# Patient Record
Sex: MALE | Race: WHITE | Employment: FULL TIME | URBAN - METROPOLITAN AREA
[De-identification: names, ages, dates, MRNs, and addresses within clinical notes are randomized per-mention and may not be internally consistent; named-entity substitution may affect disease eponyms.]

---

## 2020-04-22 ENCOUNTER — VIRTUAL VISIT (OUTPATIENT)
Dept: ORTHOPEDIC SURGERY | Age: 50
End: 2020-04-22

## 2020-04-22 DIAGNOSIS — M54.17 LEFT LUMBOSACRAL RADICULOPATHY: Primary | ICD-10-CM

## 2020-04-22 DIAGNOSIS — S32.009K PSEUDOARTHROSIS OF LUMBAR SPINE: ICD-10-CM

## 2020-04-22 RX ORDER — METHYLPREDNISOLONE 4 MG/1
TABLET ORAL
Qty: 1 DOSE PACK | Refills: 0 | Status: ON HOLD | OUTPATIENT
Start: 2020-04-22 | End: 2020-07-28 | Stop reason: ALTCHOICE

## 2020-04-22 RX ORDER — NAPROXEN 500 MG/1
500 TABLET ORAL
COMMUNITY
Start: 2020-04-06 | End: 2020-07-13 | Stop reason: SDUPTHER

## 2020-04-22 RX ORDER — SIMVASTATIN 40 MG/1
40 TABLET, FILM COATED ORAL
COMMUNITY
Start: 2020-01-28

## 2020-04-22 RX ORDER — GABAPENTIN 100 MG/1
CAPSULE ORAL
Qty: 60 CAP | Refills: 1 | Status: SHIPPED | OUTPATIENT
Start: 2020-04-22 | End: 2020-05-12 | Stop reason: SDUPTHER

## 2020-04-22 NOTE — PROGRESS NOTES
Carol Casanova is a 54 y.o. male who was seen by synchronous (real-time) audio-video technology on 4/22/2020 through a [employer] platform. Consent:  He and/or his healthcare decision maker is aware that this patient-initiated Telehealth encounter is a billable service, with coverage as determined by his insurance carrier. He is aware that he may receive a bill and has provided verbal consent to proceed: Yes    I was in the office while conducting this encounter. Patient was at home. Visit start time 8:37 AM, end time 8:57 AM.        Assessment & Plan:   Diagnoses and all orders for this visit:    1. Left lumbosacral radiculopathy  -     methylPREDNISolone (MEDROL DOSEPACK) 4 mg tablet; Take as directed  -     gabapentin (NEURONTIN) 100 mg capsule; One tab po qhs x 3 days, then increase to one tab po bid thereafter  Indications: neuropathic pain    2. 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 54 y.o. male self-employed  3 weeks history of acute onset radiculopathy. I he does not appear to have progressive neurologic deficit. I am concerned about his gait and weakness. He is going to call in to the office in 2 days time to give me an update. He may need an MRI and decompressive surgery if he has progressive weakness. 2. Rx for MDP. No Naprosyn concurrently. 3. Trial of Gabapentin  4. If symptoms progress, I will see him in the office next week. Discussed emergent symptoms, may call at any time. 5. Avoid repetitive bending, lifting, and twisting   6. Advised pt all non-essential surgeries, spinal injections and MRI have been postponed due to COVID 19.   7. COVID 19 precautions: handwashing, staying at home, physical distancing. Do get some fresh air and sunshine. Follow-up and Dispositions    · Return for call office Friday with report of symptoms.         Cc: [facility]    Subjective:       Carol Casanova is seen today as a New Pt referred by  Mahesh Douglas for Back Pain (virtual Visit) and Leg Pain      Pain Assessment  4/22/2020   Location of Pain Back;Buttocks   Location Modifiers Left   Severity of Pain 4   Quality of Pain Aching   Quality of Pain Comment stabbing   Frequency of Pain Constant   Aggravating Factors (No Data)   Aggravating Factors Comment sneezing, positional    Limiting Behavior Some   Relieving Factors Heat   Relieving Factors Comment meds        Pt presents today with c/o back pain radiating into L buttock, posterior thigh and calf. He states this started 3 weeks ago upon waking up. He c/o worse pain with coughing, sneezing. He notes some relief with sitting. He admits he drags his LLE when in pain. Admits he trips when using ladders, but denies foot drop. He admits his pain wakes him at night. Denies RLE pain. Notes hx of episodic back pain. Pt reports taking Napryson 500mg PRN with some benefit, denies side effects. However he notes one instance of nausea with unclear cause. Pt affirms trying inversion and heat with benefit. He indicates his pain worsens through the day. He denies saddle paresthesias. Notes recent fecal urgency, denies other neurogenic bowel or bladder changes. Denies fever or cough. Affirms previous benefit with MDP for other issues. Pt denies any recent GI ulcers, bleeds or renal dysfucntion. Notes 1 bleeding ulcer in high school, but has had none since, even with using NSAIDS. Reviewed x-ray report from chiropractor down the. As I do not have the actual images I am uncertain as to if he has a mehdi articulation or a pseudoarthrosis, this may just be a matter semantics. Treatments patient has tried:  Physical therapy:No  Chiropractor: Yes temporary benefit  Doing HEP: Unknown  Non-opioid medications: Yes  Spinal injections: No  Spinal surgery- No.   Last L XR 2020: L L5-S1 pseudoarthrosis   Results reviewed with pt. Full report scanned in chart.  reviewed.  PMHx of chronic R knee pain, hyperlipidemia, remote bleeding ulcer (high school). Pt works as self employed , days and nights. He notes he is busier now than before. Controlled Substance Monitoring:    No flowsheet data found. Prior to Admission medications    Medication Sig Start Date End Date Taking? Authorizing Provider   naproxen (NAPROSYN) 500 mg tablet Take 500 mg by mouth two (2) times daily as needed. 4/6/20  Yes Provider, Historical   simvastatin (ZOCOR) 40 mg tablet Take 40 mg by mouth nightly. 1/28/20  Yes Provider, Historical   methylPREDNISolone (MEDROL DOSEPACK) 4 mg tablet Take as directed 4/22/20  Yes Sulma Canada MD   gabapentin (NEURONTIN) 100 mg capsule One tab po qhs x 3 days, then increase to one tab po bid thereafter  Indications: neuropathic pain 4/22/20  Yes Sulma Canada MD     Allergies no known allergies    Past Medical History:   Diagnosis Date    Chest pain, unspecified     Other and unspecified hyperlipidemia      Past Surgical History:   Procedure Laterality Date    IR CHOLECYSTOSTOMY PERCUTANEOUS  2006        Review of Systems   Constitutional: Negative for fever. Respiratory: Negative for cough. Musculoskeletal: Positive for back pain. Neurological: Negative for tingling and focal weakness. Psychiatric/Behavioral: The patient has insomnia. GENERAL :  Well developed, no acute distress  HENT  :  Atraumatic, normocephalic   SKIN:   No rash on visible areas. No abrasions. RESPIRATORY:  Non-labored breathing. No accessory respiratory muscle use. NEURO:  No tremor noted. Facial muscles symmetric. PSYCHIATRIC:  Normal affect. Conversant with normal thought process  MUSCULOSKELETAL: Ambulation with limp LLE and mild hip hiking. Due to this being a TeleHealth evaluation, many elements of the physical examination are unable to be assessed. We discussed the expected course, resolution and complications of the diagnosis(es) in detail.   Medication risks, benefits, interactions, and alternatives were discussed as indicated. I advised him to contact the office if his condition worsens, changes or fails to improve as anticipated. He expressed understanding with the diagnosis(es) and plan. Pursuant to the emergency declaration under the [facility], [facility] waiver authority and the [employer] Act, this Virtual  Visit was conducted, with patient's consent, to reduce the patient's risk of exposure to COVID-19 and provide continuity of care for an established patient. Services were provided through a video synchronous discussion virtually to substitute for in-person clinic visit. Dragon voice recognition software was used in the creation of this note. Unintended transcription, spelling, and grammar errors may be present. This document has been electronically signed but not proofread for these specific errors. Written by Sonam Morse, as dictated by Kenzie Ramesh MD.    I, Dr. Kenzie Raemsh MD, confirm that all documentation is accurate.

## 2020-04-24 ENCOUNTER — TELEPHONE (OUTPATIENT)
Dept: ORTHOPEDIC SURGERY | Age: 50
End: 2020-04-24

## 2020-04-24 DIAGNOSIS — M54.17 LEFT LUMBOSACRAL RADICULOPATHY: Primary | ICD-10-CM

## 2020-04-24 NOTE — TELEPHONE ENCOUNTER
Left voice message for patient to return call. Should patient call back please update that Stat MRI was placed today. No further actions needed at this time.

## 2020-04-24 NOTE — TELEPHONE ENCOUNTER
Patient called for Jason Castanon. Patient had a VV appt with Jason Castanon on 4/22/20. Patient said Dr. Carla Ely prescribed him a Medrol Dose pack. Patient said Jason Castanon wanted an update on how it is working for him. Patient said he feels the same . That he does not notice any difference on his Back. That the Medrol Dose Pack is not helping. Patient tel. [phone].

## 2020-04-27 DIAGNOSIS — S05.40XA RETROBULBAR FOREIGN BODY, UNSPECIFIED LATERALITY: Primary | ICD-10-CM

## 2020-04-27 NOTE — TELEPHONE ENCOUNTER
Called pt and he wants to keep his scheduled apt on 5-1 at [facility]. Sent notes to [employer] to get auth.

## 2020-04-27 NOTE — PROGRESS NOTES
Dwight Jones requested xray of the patient Orbits be placed prior to MRI on 5/1/2020 due to when scheduling the MRI patient stated he had gotten metals in his eyes.

## 2020-05-01 ENCOUNTER — HOSPITAL ENCOUNTER (OUTPATIENT)
Age: 50
Discharge: HOME | End: 2020-05-01
Attending: NURSE PRACTITIONER
Payer: COMMERCIAL

## 2020-05-01 DIAGNOSIS — M54.17 LEFT LUMBOSACRAL RADICULOPATHY: ICD-10-CM

## 2020-05-01 PROCEDURE — 72148 MRI LUMBAR SPINE W/O DYE: CPT

## 2020-05-01 NOTE — PROGRESS NOTES
Called and spoke with pt regarding L MRI results showing mild disc herniation. He reports that his leg pain has improved. He is on Gabapentin and Naproxen, he denies any weakness. He would like to wait on any surgery but would consider an injection if and when available.      Add this pt onto Dr. Xochitl Ruiz schedule in about 3 wks for follow up via VV

## 2020-05-07 NOTE — TELEPHONE ENCOUNTER
----- Message from Erickson Woodruff MD sent at 5/4/2020 11:40 AM EDT -----  Patient can be scheduled for an in office (1st available) visit for consideration of injections. Cx VV. We have never seen him in person. We should be opening up block in the next 2 weeks. Ok to schedule for Wed or Thursday this week, I should be back in office by then.

## 2020-05-07 NOTE — TELEPHONE ENCOUNTER
Spoke with pt, he requested Tuesday May 12th, due to not being available today. Pt will be seen in the office 5/12/20 with Dr. El Lara. No further actions needed at this time.

## 2020-05-12 ENCOUNTER — OFFICE VISIT (OUTPATIENT)
Dept: ORTHOPEDIC SURGERY | Age: 50
End: 2020-05-12

## 2020-05-12 VITALS
TEMPERATURE: 98.2 F | HEART RATE: 100 BPM | WEIGHT: 229 LBS | RESPIRATION RATE: 19 BRPM | DIASTOLIC BLOOD PRESSURE: 76 MMHG | SYSTOLIC BLOOD PRESSURE: 128 MMHG

## 2020-05-12 DIAGNOSIS — M54.17 LEFT LUMBOSACRAL RADICULOPATHY: ICD-10-CM

## 2020-05-12 DIAGNOSIS — M51.26 HNP (HERNIATED NUCLEUS PULPOSUS), LUMBAR: Primary | ICD-10-CM

## 2020-05-12 RX ORDER — GABAPENTIN 100 MG/1
100 CAPSULE ORAL 3 TIMES DAILY
Qty: 180 CAP | Refills: 0 | Status: SHIPPED | OUTPATIENT
Start: 2020-05-12 | End: 2020-05-13 | Stop reason: SDUPTHER

## 2020-05-12 NOTE — PROGRESS NOTES
[address], [address]  [place]  Phone: [phone]  Fax: [phone]        Shannan Stewart  : 1970  PCP: Isamar Butler MD    PROGRESS NOTE      ASSESSMENT AND PLAN    Diagnoses and all orders for this visit:    1. HNP (herniated nucleus pulposus), lumbar, L L4/5, onset early 2020    2. Left lumbosacral radiculopathy  -     gabapentin (NEURONTIN) 100 mg capsule; Take 1 Cap by mouth three (3) times daily. Max Daily Amount: 300 mg. Indications: neuropathic pain      3 72-year-old self-employed  with 6 weeks of lumbar radiculopathy secondary to a left lateral HNP at lumbar 4/5. Patient is clinically stable. 2. No indications for lumbar surgery at this time. 3. Increase Gabapentin to 100mg TID. May take 2 tab QHS if needed. 4. Discussed life style modification, PT, medication, spinal injection, and surgery as treatment options   5. Would hold off doing spinal injections for now.,  Will reevaluate at next visit. 6. Avoid repetitive bending, lifting, and twisting. Avoid lifting more than 20lbs if able. 7. Given information on HNP, MAGO     Follow-up and Dispositions    · Return in about 1 month (around 2020) for in office per Dr. Moo Handy. HISTORY OF PRESENT ILLNESS  Steven Rivera is a 54 y.o. male. Pt presents to the office for a f/u visit for L lumbosacral radiculopathy. He was last evaluated 20 virtually and given MDP and trial of Gabapentin at that time. Since last OV pt called with persistent symptoms and a lumbar spine MRI was ordered. Images reviewed with pt. He notes benefit with MDP. He reports good benefit with Gabapentin overall though it does not erase all of his pain. Pt notes one day he skipped Gabapentin because he was rushing, but had a noticeable increase in pain. Denies negative side effects.  Pt notes his wife is painting the bedroom and the mattress was on the floor without the box spring the night his pain developed 6 weeks ago. He states while it was more aggravated he has a lot of pain with laying flat, so enjoyed the recliner. At that time he also had shooting pain with sneezing. Now he states he has mild irritation in LLE with sneezing. He states at this time his pain is worse after work when he has been moving and bending and such. LLE > low back. He admits to a history of minor low back pain. Location of pain: low back  Does pain radiate into extremities: L buttock, posterior thigh to midcalf  Does patient have weakness: no   Pt denies saddle paresthesias. Medications pt is on: Gabapentin 100mg BID with benefit. Naprosyn PRN (1 every few days), admits to nausea w/o food. Sulfur supplement. Denies persistent fevers, chills, weight changes, neurogenic bowel or bladder symptoms. Pt denies recent ED visits or hospitalizations. Treatments patient has tried:  Physical therapy:No  Chiropractor: Yes temporary benefit 4/2020  Doing HEP: Unknown  Non-opioid medications: Yes  Spinal injections: No  Spinal surgery- No.   Last L MRI 5/2020: HNP L lateral recess L4-5  Last L XR 2020: L L5-S1 pseudoarthrosis      reviewed. PMHx of chronic R knee pain, hyperlipidemia, remote bleeding ulcer (high school). Pt works as self employed , days and nights. He notes he is busier now than before. Pain Assessment  5/12/2020   Location of Pain Back; Hip   Location Modifiers Left   Severity of Pain 3   Quality of Pain Aching   Quality of Pain Comment -   Frequency of Pain Constant   Aggravating Factors Standing;Walking;Bending   Aggravating Factors Comment pressure   Limiting Behavior Some   Relieving Factors (No Data)   Relieving Factors Comment gabapentin   Result of Injury No     MRI Results (most recent):  Results from Hospital Encounter encounter on 05/01/20   MRI LUMB SPINE WO CONT    Narrative EXAM: MRI LUMB SPINE WO CONT    CLINICAL INDICATIONS/HISTORY: LLE pain and weakness over the past one month  without preceding injury    COMPARISON: Body CT 2006    Technique: Multi-sequence multiplanar T1, T2, STIR imaging with and without fat  saturation obtained centered on the lumbar spine. FINDINGS:     Alignment: Intact lordosis  Vertebral body height: Normal  Marrow signal: Unremarkable  Disc spaces: Mild narrowing and desiccation of L2-3  Conus: Terminates at T12-L1    Axial imaging correlation:    T12-L1: Patent canal and foramina. L1-2: Patent canal and foramina. L2-3: Patent canal and foramina. L3-4: Patent canal and foramina. L4-5: There is a left paracentral disc extrusion extending caudad. This impinges  the crossing left L5 nerve as on axial T2 series 6 images 12-13. Minor facet  arthropathy. No central spinal stenosis. The foramina are patent. L5-S1: Patent canal and foramina. Other structures: Unremarkable. Impression IMPRESSION:    1. Small focal left paracentral disc extrusion at L4-5 impinges the left L5  nerve; as outlined above  -Patent canal and foramina                PAST MEDICAL HISTORY   Past Medical History:   Diagnosis Date    Chest pain, unspecified     Other and unspecified hyperlipidemia        Past Surgical History:   Procedure Laterality Date    IR CHOLECYSTOSTOMY PERCUTANEOUS  2006   . MEDICATIONS      Current Outpatient Medications   Medication Sig Dispense Refill    gabapentin (NEURONTIN) 100 mg capsule Take 1 Cap by mouth three (3) times daily. Max Daily Amount: 300 mg. Indications: neuropathic pain 180 Cap 0    simvastatin (ZOCOR) 40 mg tablet Take 40 mg by mouth nightly.  naproxen (NAPROSYN) 500 mg tablet Take 500 mg by mouth two (2) times daily as needed.  methylPREDNISolone (MEDROL DOSEPACK) 4 mg tablet Take as directed 1 Dose Pack 0        Controlled Substance Monitoring:    No flowsheet data found.      ALLERGIES  No Known Allergies       SOCIAL HISTORY    Social History     Socioeconomic History    Marital status:      Spouse name: Not on file    Number of children: Not on file    Years of education: Not on file    Highest education level: Not on file   Occupational History    Not on file   Social Needs    Financial resource strain: Not on file    Food insecurity     Worry: Not on file     Inability: Not on file    Transportation needs     Medical: Not on file     Non-medical: Not on file   Tobacco Use    Smoking status: Unknown If Ever Smoked   Substance and Sexual Activity    Alcohol use: Not on file     Comment: no significant hx     Drug use: Not on file    Sexual activity: Not on file   Lifestyle    Physical activity     Days per week: Not on file     Minutes per session: Not on file    Stress: Not on file   Relationships    Social connections     Talks on phone: Not on file     Gets together: Not on file     Attends Latter day service: Not on file     Active member of club or organization: Not on file     Attends meetings of clubs or organizations: Not on file     Relationship status: Not on file    Intimate partner violence     Fear of current or ex partner: Not on file     Emotionally abused: Not on file     Physically abused: Not on file     Forced sexual activity: Not on file   Other Topics Concern    Not on file   Social History Narrative    Not on file       FAMILY HISTORY    Family History   Problem Relation Age of Onset    Heart Disease Other         general family hx of ischemic heart disease    Cancer Mother         pancreatic    Hypertension Father     Heart Disease Father        REVIEW OF SYSTEMS  Review of Systems   Constitutional: Negative for chills, fever and weight loss. Respiratory: Negative for shortness of breath. Cardiovascular: Negative for chest pain. Gastrointestinal: Negative for constipation. Negative for fecal incontinence   Genitourinary: Negative for dysuria.         Negative for urinary incontinence   Musculoskeletal:        Per HPI   Skin: Negative for rash. Neurological: Positive for focal weakness. Negative for dizziness, tingling, tremors and headaches. Endo/Heme/Allergies: Does not bruise/bleed easily. Psychiatric/Behavioral: The patient does not have insomnia. PHYSICAL EXAMINATION  Visit Vitals  /76 (BP 1 Location: Left arm, BP Patient Position: Sitting)   Pulse 100   Temp 98.2 °F (36.8 °C) (Oral)   Resp 19   Wt 229 lb (103.9 kg)         Accompanied by self. Constitutional:  Well developed, well nourished, in no acute distress. Psychiatric: Affect and mood are appropriate. Integumentary: No rashes or abrasions noted on exposed areas. Cardiovascular/Peripheral Vascular: No peripheral edema is noted BLE. SPINE/MUSCULOSKELETAL EXAM      Lumbar spine:  No rash, ecchymosis, or gross obliquity. No fasciculations. No focal atrophy is noted. Pain with lumbar forward flexion. Intact extension. Tenderness to palpation none lumbar spine. No tenderness to palpation at the sciatic notch. SI joints non-tender. Trochanters non tender. MOTOR:     Hip Flex Quads Hamstrings Ankle DF EHL Ankle PF   Right 5/5 5/5 5/5 5/5 5/5 5/5   Left 5/5 5/5 5/5 5/5 5/5 5/5     Mild L eversion weakness +4/5    Straight Leg raise mildly positive on L at 90 degrees. No difficulty with tandem gait. Intact Heel walk. Ambulation without assistive device. FWB. Written by Devona Rinne, as dictated by Heather Sanchez MD.    I, Dr. Heather Sanchez MD, confirm that all documentation is accurate. Mr. Killian Taylor may have a reminder for a \"due or due soon\" health maintenance. I have asked that he contact his primary care provider for follow-up on this health maintenance.

## 2020-05-12 NOTE — PROGRESS NOTES
Alexia Rodrigues presents today for No chief complaint on file. Is someone accompanying this pt? NO    Is the patient using any DME equipment during OV? NO    Depression Screening:  3 most recent PHQ Screens 5/12/2020   Little interest or pleasure in doing things Not at all   Feeling down, depressed, irritable, or hopeless Not at all   Total Score PHQ 2 0         Coordination of Care:  1. Have you been to the ER, urgent care clinic since your last visit? NO  Hospitalized since your last visit? NO    2. Have you seen or consulted any other health care providers outside of the [address] [place] [address] since your last visit? NO Include any pap smears or colon screening.  NO    Last  Checked 5/12/2020

## 2020-05-12 NOTE — PATIENT INSTRUCTIONS
Herniated Disc: Care Instructions Your Care Instructions The bones that form the spine in your back are cushioned by small discs. If a disc is damaged, it may bulge or break open (herniate). A herniated disc can result from normal wear and tear as we age or from an injury or disease. If a herniated disc presses on a nerve, it can cause pain and numbness in your leg (sciatica) and/or back pain. You may be able to heal your herniated disc with a few weeks or months of rest, medicine, and exercises. In some cases, you may need surgery. Follow-up care is a key part of your treatment and safety. Be sure to make and go to all appointments, and call your doctor if you are having problems. It's also a good idea to know your test results and keep a list of the medicines you take. How can you care for yourself at home? · Take your medicines exactly as prescribed. Call your doctor if you think you are having a problem with your medicine. · Ask your doctor if you can take an over-the-counter pain medicine, such as acetaminophen (Tylenol), ibuprofen (Advil, Motrin), or naproxen (Aleve). Read and follow all instructions on the label. · Do not take two or more pain medicines at the same time unless the doctor told you to. Many pain medicines have acetaminophen, which is Tylenol. Too much acetaminophen (Tylenol) can be harmful. · Rest your back if your pain is severe. · Avoid movements and positions that increase your pain or numbness. · Try taking short walks and doing light activities that do not cause pain. Even if you are feeling some pain, it is important to keep your muscles active and strong. · Use heat or ice to relieve pain. ? To apply heat, put a warm water bottle, heating pad set on low, or warm cloth on your back. Do not go to sleep with a heating pad on your skin. ? To use ice, put ice or a cold pack on the area for 10 to 20 minutes at a time. Put a thin cloth between the ice and your skin. · Your doctor may recommend a physical therapy program, where you learn exercises to do at home. These exercises strengthen the muscles that support your lower back and prevent reinjury. · Stay at a healthy weight. This may reduce the load on your back. · Quit smoking if you smoke. If you need help quitting, talk to your doctor about stop-smoking programs and medicines. These can increase your chances of quitting for good. · To avoid hurting your back when lifting: ? Lift with your legs, not your back, by squatting and bending your knees. Avoid bending forward at the waist when lifting. ? Rise slowly. ? Keep the load as close to your body as possible, at the level of your navel. ? Avoid turning or twisting your body while holding a heavy object. ? Get help if you need to lift a heavy object. Never lift a heavy object above shoulder level. When should you call for help? Call 911 anytime you think you may need emergency care. For example, call if: 
  · You are unable to move a leg at all.  
[facility] your doctor now or seek immediate medical care if: 
  · You have new or worse symptoms in your arms, legs, chest, belly, or buttocks. Symptoms may include: 
? Numbness or tingling. ? Weakness. ? Pain.  
  · You lose bladder or bowel control.  
 Watch closely for changes in your health, and be sure to contact your doctor if: 
  · You are not getting better as expected. Where can you learn more? Go to http://regina-les.info/ Enter F534 in the search box to learn more about \"Herniated Disc: Care Instructions. \" Current as of: June 26, 2019Content Version: 12.4 © 2804-2011 Healthwise[employer] Incorporated. Care instructions adapted under license by [employer] (which disclaims liability or warranty for this information).  If you have questions about a medical condition or this instruction, always ask your healthcare professional. Mason Patiño disclaims any warranty or liability for your use of this information. Learning About Lumbar Epidural Steroid Injections What is a lumbar epidural steroid injection? A doctor may give you a lumbar epidural steroid injection to try to decrease pain, tingling, or numbness in your back, buttock, or leg. These might be the result of a back or disc problem. The injection goes directly into your epidural space. This is the area in your back around your spinal cord. This injection may have both a local anesthetic and a steroid medicine. Or it may only have a steroid. Local anesthetic medicines numb your nerves right away for a short time. Steroids reduce swelling and pain. But they take a few days to start working. Some people get a series of these injections over weeks or months. How is a lumbar epidural done? The doctor may use an imaging test before or during your injection. This can be an MRI, a CT scan, or an X-ray. These tests can show where your nerve problems are. After finding the right spot, the doctor may inject a numbing medicine into the skin where you will get the steroid injection. Then he or she puts the needle for the steroid into the numbed area. You may feel some pressure. You could feel some stinging or burning during the injection. It takes about 10 to 15 minutes to get this injection. You will probably go home about 20 to 30 minutes after you get it. You will need someone to drive you home. What can you expect after a lumbar epidural? 
If your injection had local anesthetic and a steroid, your legs may feel heavy or numb right after. You will probably be able to walk. But you may need to be extra careful. Take care not to lose your balance and be sure to follow your doctor's instructions. If your injection contained local anesthetic, you may feel better right away. But this pain relief will last only a few hours. Your pain will probably return.  This is because the steroids have not started working yet. Before the steroids start to work, your back may be sore for a few days. These injections don't always work. When they do, it takes 1 to 5 days. This pain relief can last for several days to a few months or longer. You may want to do less than normal for a few days. But you may also be able to return to your daily routine. Some people are dizzy or feel sick to their stomach after getting this injection. These symptoms usually do not last very long. If your pain is better, you may be able to keep doing your normal activities or physical therapy. But try not to overdo it, even if your back pain has improved a lot. If your pain is only a little better or if it comes back, your doctor may recommend another injection in a few weeks. If your pain has not changed, talk to your doctor about other treatment choices. Side effects from an epidural steroid injection include headache, fever, or infection. Serious side effects are rare. But they can include stroke, paralysis, or loss of vision. Follow-up care is a key part of your treatment and safety. Be sure to make and go to all appointments, and call your doctor if you are having problems. It's also a good idea to know your test results and keep a list of the medicines you take. Where can you learn more? Go to http://regina-les.info/ Enter Reagannerissa Cowart in the search box to learn more about \"Learning About Lumbar Epidural Steroid Injections. \" Current as of: June 26, 2019Content Version: 12.4 © 4942-6968 Healthwise, Incorporated. Care instructions adapted under license by [employer] (which disclaims liability or warranty for this information). If you have questions about a medical condition or this instruction, always ask your healthcare professional. [address] any warranty or liability for your use of this information.

## 2020-05-13 DIAGNOSIS — M54.17 LEFT LUMBOSACRAL RADICULOPATHY: ICD-10-CM

## 2020-05-13 RX ORDER — GABAPENTIN 100 MG/1
100 CAPSULE ORAL 3 TIMES DAILY
Qty: 270 CAP | Refills: 0 | Status: SHIPPED | OUTPATIENT
Start: 2020-05-13 | End: 2020-07-13 | Stop reason: DRUGHIGH

## 2020-05-13 NOTE — TELEPHONE ENCOUNTER
Insurance requires a minimum fill for 90 days    Requested Prescriptions     Pending Prescriptions Disp Refills    gabapentin (NEURONTIN) 100 mg capsule 270 Cap 0     Sig: Take 1 Cap by mouth three (3) times daily. Max Daily Amount: 300 mg.  Indications: neuropathic pain

## 2020-07-13 ENCOUNTER — OFFICE VISIT (OUTPATIENT)
Dept: ORTHOPEDIC SURGERY | Age: 50
End: 2020-07-13

## 2020-07-13 VITALS
SYSTOLIC BLOOD PRESSURE: 118 MMHG | HEART RATE: 76 BPM | TEMPERATURE: 98.3 F | RESPIRATION RATE: 16 BRPM | WEIGHT: 244 LBS | HEIGHT: 68 IN | OXYGEN SATURATION: 95 % | DIASTOLIC BLOOD PRESSURE: 82 MMHG | BODY MASS INDEX: 36.98 KG/M2

## 2020-07-13 DIAGNOSIS — M54.17 LEFT LUMBOSACRAL RADICULOPATHY: ICD-10-CM

## 2020-07-13 DIAGNOSIS — M51.26 HNP (HERNIATED NUCLEUS PULPOSUS), LUMBAR: Primary | ICD-10-CM

## 2020-07-13 PROBLEM — E66.01 SEVERE OBESITY (HCC): Status: ACTIVE | Noted: 2020-07-13

## 2020-07-13 RX ORDER — GABAPENTIN 300 MG/1
300 CAPSULE ORAL 3 TIMES DAILY
Qty: 270 CAP | Refills: 0 | Status: SHIPPED | OUTPATIENT
Start: 2020-07-13 | End: 2020-10-11

## 2020-07-13 RX ORDER — NAPROXEN 500 MG/1
500 TABLET ORAL
Qty: 100 TAB | Refills: 0 | Status: SHIPPED | OUTPATIENT
Start: 2020-07-13 | End: 2021-07-02 | Stop reason: SDUPTHER

## 2020-07-13 RX ORDER — RIZATRIPTAN BENZOATE 10 MG/1
TABLET, ORALLY DISINTEGRATING ORAL
COMMUNITY
Start: 2019-12-18

## 2020-07-13 NOTE — PROGRESS NOTES
[address], [address]  [place], [address] [place] [address] Street  Phone: [phone]  Fax: [phone]        Gómez Chawla  : 1970  PCP: Giovanny Ospina MD    PROGRESS NOTE      ASSESSMENT AND PLAN    Diagnoses and all orders for this visit:    1. HNP (herniated nucleus pulposus), lumbar,  L L4/5, symptomatic 3/2020  -     naproxen (NAPROSYN) 500 mg tablet; Take 1 Tab by mouth two (2) times daily as needed for Pain.  -     SCHEDULE SURGERY  -     gabapentin (NEURONTIN) 300 mg capsule; Take 1 Cap by mouth three (3) times daily for 90 days. Max Daily Amount: 900 mg.    2. Left lumbosacral radiculopathy      1. 54 y.o. male quang-employed  w/ 4 months of symptomatic HNP. 2. Advised to continue HEP  3. Avoid repetitive bending, lifting, and twisting  4. Advised to talk to GI doctor about his GI issues  5. Continue Naprosyn  6. Increase Gabapentin 300 mg TID  7. Discussed life style modification, PT, medication, spinal injection, and surgery as treatment options. Wants to wait until winter for sx  8. SNRB L L5      Follow-up and Dispositions    · Return in 1 month (on 2020) for After injections. HISTORY OF PRESENT ILLNESS  Carlos Mcbride is a 54 y.o. male. Pt was last evaluated 2020 for HNP. Increase Gabapentin to 100 mg TID. Held off on spinal injections to reevaluate at next visit. Pt states that his pain has been getting worse after previously being better for a few weeks, noting that his pain is coming over to the right side now. He states that he can't stand because the pain starts immediately. Pt denies any specific incident or injury that caused their pain. Pain Assessment  2020   Location of Pain Back;Leg   Location Modifiers Right;Left   Severity of Pain 5   Quality of Pain Sharp; Other (Comment)   Quality of Pain Comment stabbing   Duration of Pain Persistent   Frequency of Pain Constant   Aggravating Factors Standing;Walking   Aggravating Factors Comment -   Limiting Behavior Some   Relieving Factors Rest   Relieving Factors Comment sitting   Result of Injury No       Does pain radiate into extremities: R hip  Does patient have weakness: No  Pt denies saddle paresthesias. Reports fecal incontinence and leakage x years. Medications pt is on: Gabapentin 100mg 1 qam, 2 qhs with benefit. Naprosyn 500 mg PRN (1 every few days), admits to nausea w/o food. Sulfur supplement. Denies persistent fevers, chills, weight changes, neurogenic bladder symptoms.       Treatments patient has tried:  Physical therapy:No  Chiropractor: Yes temporary benefit 4/2020  Doing HEP: Yes; stretches  Non-opioid medications: Yes  Spinal injections: No  Spinal surgery- No.   Last L MRI 5/2020: HNP L lateral recess L4-5  Last L XR 2020: L L5-S1 pseudoarthrosis       reviewed. PMHx of chronic R knee pain, hyperlipidemia, remote bleeding ulcer (high school), chronic diarrhea.  Pt works as self employed , days and nights. He notes he is busier now than before. Recently lost father, and his foster daughter was adopted. PAST MEDICAL HISTORY   Past Medical History:   Diagnosis Date    Chest pain, unspecified     Other and unspecified hyperlipidemia        Past Surgical History:   Procedure Laterality Date    IR CHOLECYSTOSTOMY PERCUTANEOUS  2006   . MEDICATIONS      Current Outpatient Medications   Medication Sig Dispense Refill    rizatriptan (MAXALT-MLT) 10 mg disintegrating tablet take 1 tablet by mouth AT START OF HEADACHE, REPEAT 1 TIME IN 2 HOURS IF PAIN PERSIST maximum daily dose of 2      naproxen (NAPROSYN) 500 mg tablet Take 1 Tab by mouth two (2) times daily as needed for Pain. 100 Tab 0    gabapentin (NEURONTIN) 300 mg capsule Take 1 Cap by mouth three (3) times daily for 90 days. Max Daily Amount: 900 mg. 270 Cap 0    simvastatin (ZOCOR) 40 mg tablet Take 40 mg by mouth nightly.       methylPREDNISolone (MEDROL DOSEPACK) 4 mg tablet Take as directed 1 Dose Pack 0        Controlled Substance Monitoring:    No flowsheet data found.      ALLERGIES  No Known Allergies       SOCIAL HISTORY    Social History     Socioeconomic History    Marital status:      Spouse name: Not on file    Number of children: Not on file    Years of education: Not on file    Highest education level: Not on file   Occupational History    Not on file   Social Needs    Financial resource strain: Not on file    Food insecurity     Worry: Not on file     Inability: Not on file    Transportation needs     Medical: Not on file     Non-medical: Not on file   Tobacco Use    Smoking status: Never Smoker    Smokeless tobacco: Never Used   Substance and Sexual Activity    Alcohol use: Never     Frequency: Never     Comment: no significant hx     Drug use: Never    Sexual activity: Not on file   Lifestyle    Physical activity     Days per week: Not on file     Minutes per session: Not on file    Stress: Not on file   Relationships    Social connections     Talks on phone: Not on file     Gets together: Not on file     Attends Synagogue service: Not on file     Active member of club or organization: Not on file     Attends meetings of clubs or organizations: Not on file     Relationship status: Not on file    Intimate partner violence     Fear of current or ex partner: Not on file     Emotionally abused: Not on file     Physically abused: Not on file     Forced sexual activity: Not on file   Other Topics Concern    Not on file   Social History Narrative    Not on file     Socioeconomic History    Marital status:      Spouse name: Not on file    Number of children: Not on file    Years of education: Not on file    Highest education level: Not on file   Occupational History    Not on file   Social Needs    Financial resource strain: Not on file    Food insecurity     Worry: Not on file     Inability: Not on file   [address] [place] [address] Transportation needs     Medical: Not on file     Non-medical: Not on file   Tobacco Use    Smoking status: Never Smoker    Smokeless tobacco: Never Used   Substance and Sexual Activity    Alcohol use: Never     Frequency: Never     Comment: no significant hx     Drug use: Never    Sexual activity: Not on file   Lifestyle    Physical activity     Days per week: Not on file     Minutes per session: Not on file    Stress: Not on file   Relationships    Social connections     Talks on phone: Not on file     Gets together: Not on file     Attends Sikh service: Not on file     Active member of club or organization: Not on file     Attends meetings of clubs or organizations: Not on file     Relationship status: Not on file    Intimate partner violence     Fear of current or ex partner: Not on file     Emotionally abused: Not on file     Physically abused: Not on file     Forced sexual activity: Not on file   Other Topics Concern    Not on file   Social History Narrative    Not on file      Problem Relation Age of Onset    Heart Disease Other         general family hx of ischemic heart disease    Cancer Mother         pancreatic    Hypertension Father     Heart Disease Father        REVIEW OF SYSTEMS  Review of Systems   Constitutional: Negative for chills, fever and weight loss. Respiratory: Negative for shortness of breath. Cardiovascular: Negative for chest pain. Gastrointestinal: Positive for diarrhea. Negative for constipation. Positive for fecal incontinence   Genitourinary: Negative for dysuria. Negative for urinary incontinence   Musculoskeletal: Positive for back pain and joint pain. Per HPI   Skin: Negative for rash. Neurological: Negative for dizziness, tingling, tremors, focal weakness and headaches. Endo/Heme/Allergies: Does not bruise/bleed easily. Psychiatric/Behavioral: The patient does not have insomnia.         PHYSICAL EXAMINATION  Visit Vitals  BP 118/82 (BP 1 Location: Right arm, BP Patient Position: Sitting)   Pulse 76   Temp 98.3 °F (36.8 °C) (Oral)   Resp 16   Ht 5' 8\" (1.727 m)   Wt 244 lb (110.7 kg)   SpO2 95%   BMI 37.10 kg/m²         Accompanied by self. Constitutional:  Well developed, well nourished, in no acute distress. Psychiatric: Affect and mood are appropriate. Integumentary: No rashes or abrasions noted on exposed areas. Cardiovascular/Peripheral Vascular: No peripheral edema is noted BLE. SPINE/MUSCULOSKELETAL EXAM    Lumbar spine:  No rash, ecchymosis, or gross obliquity. No fasciculations. No focal atrophy is noted. Tenderness to palpation L L4/5, L sciatic notch. SI joints non-tender. Trochanters non tender. MOTOR:     Hip Flex Quads Hamstrings Ankle DF EHL Ankle PF   Right 5/5 5/5 5/5 5/5 5/5 5/5   Left 5/5 5/5 5/5 5/5 5/5 5/5   L eversion +4/5    Straight Leg raise positive on the L at 60 degrees. No difficulty with tandem gait. Radiating buttock pain with heel walk. Ambulation without assistive device. FWB. Written by Stephen Bragg, as dictated by Aarti Hoskins MD.    I, Dr. Aarti Hoskins MD, confirm that all documentation is accurate. Mr. Michael Oakes may have a reminder for a \"due or due soon\" health maintenance. I have asked that he contact his primary care provider for follow-up on this health maintenance.

## 2020-07-13 NOTE — H&P (VIEW-ONLY)
[address] 
[address], [address] Phone: [phone] Fax: [phone] Afua Osei : 1970 PCP: Judd Middleton MD 
 
PROGRESS NOTE ASSESSMENT AND PLAN Diagnoses and all orders for this visit: 
 
1. HNP (herniated nucleus pulposus), lumbar,  L L4/5, symptomatic 3/2020 
-     naproxen (NAPROSYN) 500 mg tablet; Take 1 Tab by mouth two (2) times daily as needed for Pain. 
-     SCHEDULE SURGERY 
-     gabapentin (NEURONTIN) 300 mg capsule; Take 1 Cap by mouth three (3) times daily for 90 days. Max Daily Amount: 900 mg. 
 
2. Left lumbosacral radiculopathy 1. 54 y.o. male quang-employed  w/ 4 months of symptomatic HNP. 2. Advised to continue HEP 3. Avoid repetitive bending, lifting, and twisting 4. Advised to talk to GI doctor about his GI issues 5. Continue Naprosyn 6. Increase Gabapentin 300 mg TID 7. Discussed life style modification, PT, medication, spinal injection, and surgery as treatment options. Wants to wait until winter for sx 8. [facility] L L5 Follow-up and Dispositions · Return in 1 month (on 2020) for After injections. HISTORY OF PRESENT ILLNESS Sherri Escudero is a 54 y.o. male. Pt was last evaluated 2020 for HNP. Increase Gabapentin to 100 mg TID. Held off on spinal injections to reevaluate at next visit. Pt states that his pain has been getting worse after previously being better for a few weeks, noting that his pain is coming over to the right side now. He states that he can't stand because the pain starts immediately. Pt denies any specific incident or injury that caused their pain. Pain Assessment  2020 Location of Pain Back;Leg Location Modifiers Right;Left Severity of Pain 5 Quality of Pain Sharp; Other (Comment) Quality of Pain Comment stabbing Duration of Pain Persistent Frequency of Pain Constant Aggravating Factors Standing;Walking Aggravating Factors Comment - Limiting Behavior Some Relieving Factors Rest  
Relieving Factors Comment sitting Result of Injury No  
 
 
Does pain radiate into extremities: R hip Does patient have weakness: No 
Pt denies saddle paresthesias. Reports fecal incontinence and leakage x years. Medications pt is on: Gabapentin 100mg 1 qam, 2 qhs with benefit. Naprosyn 500 mg PRN (1 every few days), admits to nausea w/o food. Sulfur supplement. Denies persistent fevers, chills, weight changes, neurogenic bladder symptoms.   
  
Treatments patient has tried: 
Physical therapy:No 
Chiropractor: Yes temporary benefit 4/2020 Doing HEP: Yes; stretches Non-opioid medications: Yes Spinal injections: No 
Spinal surgery- No.  
Last L MRI 5/2020: HNP L lateral recess L4-5 Last L XR 2020: L L5-S1 pseudoarthrosis  
  
 reviewed. PMHx of chronic R knee pain, hyperlipidemia, remote bleeding ulcer (high school), chronic diarrhea.  Pt works as self employed , days and nights. He notes he is busier now than before. Recently lost father, and his foster daughter was adopted. PAST MEDICAL HISTORY Past Medical History:  
Diagnosis Date  Chest pain, unspecified  Other and unspecified hyperlipidemia Past Surgical History:  
Procedure Laterality Date  IR CHOLECYSTOSTOMY PERCUTANEOUS  2006 Lindsey Muck MEDICATIONS Current Outpatient Medications Medication Sig Dispense Refill  rizatriptan (MAXALT-MLT) 10 mg disintegrating tablet take 1 tablet by mouth AT START OF HEADACHE, REPEAT 1 TIME IN 2 HOURS IF PAIN PERSIST maximum daily dose of 2    
 naproxen (NAPROSYN) 500 mg tablet Take 1 Tab by mouth two (2) times daily as needed for Pain. 100 Tab 0  
 gabapentin (NEURONTIN) 300 mg capsule Take 1 Cap by mouth three (3) times daily for 90 days. Max Daily Amount: 900 mg. 270 Cap 0  
 simvastatin (ZOCOR) 40 mg tablet Take 40 mg by mouth nightly.  methylPREDNISolone (MEDROL DOSEPACK) 4 mg tablet Take as directed 1 Dose Pack 0 Controlled Substance Monitoring: No flowsheet data found. ALLERGIES No Known Allergies SOCIAL HISTORY Social History Socioeconomic History  Marital status:  Spouse name: Not on file  Number of children: Not on file  Years of education: Not on file  Highest education level: Not on file Occupational History  Not on file Social Needs  Financial resource strain: Not on file  Food insecurity Worry: Not on file Inability: Not on file  Transportation needs Medical: Not on file Non-medical: Not on file Tobacco Use  Smoking status: Never Smoker  Smokeless tobacco: Never Used Substance and Sexual Activity  Alcohol use: Never Frequency: Never Comment: no significant hx  Drug use: Never  Sexual activity: Not on file Lifestyle  Physical activity Days per week: Not on file Minutes per session: Not on file  Stress: Not on file Relationships  Social connections Talks on phone: Not on file Gets together: Not on file Attends Shinto service: Not on file Active member of club or organization: Not on file Attends meetings of clubs or organizations: Not on file Relationship status: Not on file  Intimate partner violence Fear of current or ex partner: Not on file Emotionally abused: Not on file Physically abused: Not on file Forced sexual activity: Not on file Other Topics Concern  Not on file Social History Narrative  Not on file Socioeconomic History  Marital status:  Spouse name: Not on file  Number of children: Not on file  Years of education: Not on file  Highest education level: Not on file Occupational History  Not on file Social Needs  Financial resource strain: Not on file  Food insecurity Worry: Not on file Inability: Not on file  Transportation needs Medical: Not on file Non-medical: Not on file Tobacco Use  Smoking status: Never Smoker  Smokeless tobacco: Never Used Substance and Sexual Activity  Alcohol use: Never Frequency: Never Comment: no significant hx  Drug use: Never  Sexual activity: Not on file Lifestyle  Physical activity Days per week: Not on file Minutes per session: Not on file  Stress: Not on file Relationships  Social connections Talks on phone: Not on file Gets together: Not on file Attends Baptism service: Not on file Active member of club or organization: Not on file Attends meetings of clubs or organizations: Not on file Relationship status: Not on file  Intimate partner violence Fear of current or ex partner: Not on file Emotionally abused: Not on file Physically abused: Not on file Forced sexual activity: Not on file Other Topics Concern  Not on file Social History Narrative  Not on file Problem Relation Age of Onset  Heart Disease Other   
     general family hx of ischemic heart disease  Cancer Mother   
     pancreatic  Hypertension Father  Heart Disease Father REVIEW OF SYSTEMS Review of Systems Constitutional: Negative for chills, fever and weight loss. Respiratory: Negative for shortness of breath. Cardiovascular: Negative for chest pain. Gastrointestinal: Positive for diarrhea. Negative for constipation. Positive for fecal incontinence Genitourinary: Negative for dysuria. Negative for urinary incontinence Musculoskeletal: Positive for back pain and joint pain. Per HPI Skin: Negative for rash. Neurological: Negative for dizziness, tingling, tremors, focal weakness and headaches. Endo/Heme/Allergies: Does not bruise/bleed easily. Psychiatric/Behavioral: The patient does not have insomnia. PHYSICAL EXAMINATION Visit Vitals /82 (BP 1 Location: Right arm, BP Patient Position: Sitting) Pulse 76 Temp 98.3 °F (36.8 °C) (Oral) Resp 16 Ht 5' 8\" (1.727 m) Wt 244 lb (110.7 kg) SpO2 95% BMI 37.10 kg/m² Accompanied by self. Constitutional:  Well developed, well nourished, in no acute distress. Psychiatric: Affect and mood are appropriate. Integumentary: No rashes or abrasions noted on exposed areas. Cardiovascular/Peripheral Vascular: No peripheral edema is noted BLE. SPINE/MUSCULOSKELETAL EXAM 
 
Lumbar spine: No rash, ecchymosis, or gross obliquity. No fasciculations. No focal atrophy is noted. Tenderness to palpation L L4/5, L sciatic notch. SI joints non-tender. Trochanters non tender. MOTOR:   
 Hip Flex Quads Hamstrings Ankle DF EHL Ankle PF Right 5/5 5/5 5/5 5/5 5/5 5/5 Left 5/5 5/5 5/5 5/5 5/5 5/5 L eversion +4/5 Straight Leg raise positive on the L at 60 degrees. No difficulty with tandem gait. Radiating buttock pain with heel walk. Ambulation without assistive device. FWB. Written by Stephen Bragg, as dictated by Aarti Hoskins MD. 
 
I, Dr. Aarti Hoskins MD, confirm that all documentation is accurate. Mr. Michael Oakes may have a reminder for a \"due or due soon\" health maintenance. I have asked that he contact his primary care provider for follow-up on this health maintenance.

## 2020-07-14 PROBLEM — M51.26 HNP (HERNIATED NUCLEUS PULPOSUS), LUMBAR: Status: ACTIVE | Noted: 2020-07-14

## 2020-07-28 ENCOUNTER — APPOINTMENT (OUTPATIENT)
Dept: GENERAL RADIOLOGY | Age: 50
End: 2020-07-28
Attending: PHYSICAL MEDICINE & REHABILITATION
Payer: COMMERCIAL

## 2020-07-28 ENCOUNTER — HOSPITAL ENCOUNTER (OUTPATIENT)
Age: 50
Setting detail: OUTPATIENT SURGERY
Discharge: HOME | End: 2020-07-28
Attending: PHYSICAL MEDICINE & REHABILITATION | Admitting: PHYSICAL MEDICINE & REHABILITATION
Payer: COMMERCIAL

## 2020-07-28 VITALS
OXYGEN SATURATION: 95 % | SYSTOLIC BLOOD PRESSURE: 114 MMHG | HEART RATE: 79 BPM | DIASTOLIC BLOOD PRESSURE: 67 MMHG | RESPIRATION RATE: 20 BRPM | TEMPERATURE: 98 F

## 2020-07-28 PROCEDURE — 74011250637 HC RX REV CODE- 250/637: Performed by: PHYSICAL MEDICINE & REHABILITATION

## 2020-07-28 PROCEDURE — 74011250636 HC RX REV CODE- 250/636: Performed by: PHYSICAL MEDICINE & REHABILITATION

## 2020-07-28 PROCEDURE — 77030003669 HC NDL SPN COOK -B: Performed by: PHYSICAL MEDICINE & REHABILITATION

## 2020-07-28 PROCEDURE — 74011636320 HC RX REV CODE- 636/320: Performed by: PHYSICAL MEDICINE & REHABILITATION

## 2020-07-28 PROCEDURE — 77030039433 HC TY MYLEOGRAM BD -B: Performed by: PHYSICAL MEDICINE & REHABILITATION

## 2020-07-28 PROCEDURE — 74011000250 HC RX REV CODE- 250: Performed by: PHYSICAL MEDICINE & REHABILITATION

## 2020-07-28 PROCEDURE — 76010000009 HC PAIN MGT 0 TO 30 MIN PROC: Performed by: PHYSICAL MEDICINE & REHABILITATION

## 2020-07-28 RX ORDER — DIAZEPAM 5 MG/1
5-20 TABLET ORAL ONCE
Status: COMPLETED | OUTPATIENT
Start: 2020-07-28 | End: 2020-07-28

## 2020-07-28 RX ORDER — DEXAMETHASONE SODIUM PHOSPHATE 100 MG/10ML
INJECTION INTRAMUSCULAR; INTRAVENOUS AS NEEDED
Status: DISCONTINUED | OUTPATIENT
Start: 2020-07-28 | End: 2020-07-28 | Stop reason: HOSPADM

## 2020-07-28 RX ORDER — LIDOCAINE HYDROCHLORIDE 10 MG/ML
INJECTION, SOLUTION EPIDURAL; INFILTRATION; INTRACAUDAL; PERINEURAL AS NEEDED
Status: DISCONTINUED | OUTPATIENT
Start: 2020-07-28 | End: 2020-07-28 | Stop reason: HOSPADM

## 2020-07-28 RX ADMIN — DIAZEPAM 10 MG: 5 TABLET ORAL at 07:48

## 2020-07-28 NOTE — DISCHARGE INSTRUCTIONS
[facility]   (JENNIFER)  Dr. Skinny Stanley, Dr. Ana Siddiqi, Dr. Eunice Skiff not drive a car, operate heavy machinery or dangerous equipment for 24 hours. * Activity as tolerated; rest for the remainder of the day. * Resume pre-block medications including those for your family doctor. * Do not drink alcoholic beverages for 24 hours. Alcohol and the medications you have received may interact and cause an adverse reaction. * You may feel better this evening and worse tomorrow, as the numbing medications wears off and the steroid has yet to begin to work. After 48 hrs the steroid should begin to release bringing you relief. * You may shower this evening and remove any bandages. * Avoid hot tubs and heating pads for 24 hours. You may use cold packs on the procedure site as tolerated for the first 24 hours. * If a headache develops, drink plenty of fluids and rest.  Take over the counter medications for headache if needed. If the headache continues longer than 24 hours, call MD at the [address] [place] [address]. [phone]    * Continue taking pain medications as needed. * You may resume your regular diet if tolerated. Otherwise, start with sips of water and advance slowly. * If Diabetic: check your blood sugar three times a day for the next 3 days. If your sugar is greater than 300 call your family doctor. If your sugar is greater than 400, have someone transport you to the nearest Emergency Room. * If you experience any of the following problems, Please Call the [address] [place] [address] at [phone].         * Shortness of Breath    * Fever of 101 or higher    * Nausea / Vomiting    * Severe Headache    * Weakness or numbness in arms or legs that is not      resolving    * Prolonged increase in pain greater than 4 days      DISCHARGE SUMMARY from Nurse      PATIENT INSTRUCTIONS:    After oral sedation, for 24 hours or while taking prescription Narcotics:  · Limit your activities  · Do not drive and operate hazardous machinery  · Do not make important personal or business decisions  · Do  not drink alcoholic beverages  · If you have not urinated within 8 hours after discharge, please contact your surgeon on call. Report the following to your surgeon:  · Excessive pain, swelling, redness or odor of or around the surgical area  · Temperature over 101  · Nausea and vomiting lasting longer than 4 hours or if unable to take medications  · Any signs of decreased circulation or nerve impairment to extremity: change in color, persistent  numbness, tingling, coldness or increase pain  · Any questions            What to do at Home:  Recommended activity: Activity as tolerated, NO DRIVING FOR 12 Hours post injection          *  Please give a list of your current medications to your Primary Care Provider. *  Please update this list whenever your medications are discontinued, doses are      changed, or new medications (including over-the-counter products) are added. *  Please carry medication information at all times in case of emergency situations. These are general instructions for a healthy lifestyle:    No smoking/ No tobacco products/ Avoid exposure to second hand smoke    Surgeon General's Warning:  Quitting smoking now greatly reduces serious risk to your health. Obesity, smoking, and sedentary lifestyle greatly increases your risk for illness    A healthy diet, regular physical exercise & weight monitoring are important for maintaining a healthy lifestyle    You may be retaining fluid if you have a history of heart failure or if you experience any of the following symptoms:  Weight gain of 3 pounds or more overnight or 5 pounds in a week, increased swelling in our hands or feet or shortness of breath while lying flat in bed.   Please call your doctor as soon as you notice any of these symptoms; do not wait until your next office visit. Recognize signs and symptoms of STROKE:    F-face looks uneven    A-arms unable to move or move unevenly    S-speech slurred or non-existent    T-time-call 911 as soon as signs and symptoms begin-DO NOT go       Back to bed or wait to see if you get better-TIME IS BRAIN.

## 2020-07-28 NOTE — PROCEDURES
SELECTIVE NERVE ROOT BLOCK PROCEDURE NOTE      Patient Name: Lupillo Brown  Date of Procedure: July 28, 2020  Preoperative Diagnosis:  DDD (degenerative disc disease), lumbar [M51.36]  Post Operative Diagnosis:  DDD (degenerative disc disease), lumbar [M51.36]  Location:  [facility], Special Procedures Unit, [place], [place]    Procedure :    left L5 Selective Nerve Root Block      Consent:  Informed consent was obtained prior to the procedure. The patient was given the opportunity to ask questions regarding the procedure and its associated risks. In addition to the potential risks associated with the procedure itself, the patient was informed both verbally and in writing of the potential side effects of the use of glucocorticoid. The patient appeared to comprehend the informed consent and desired to have the procedure performed. The patient was counseled at length about the risks of anastasiya Covid-19 during their perioperative period and any recovery window from their procedure. The patient was made aware that anastasiya Covid-19  may worsen their prognosis for recovering from their procedure and lend to a higher morbidity and/or mortality risk. All material risks, benefits, and reasonable alternatives including postponing the procedure were discussed. The patient does  wish to proceed with the procedure at this time. Procedure: The patient was placed in the prone position on the fluoroscopy table and the back was prepped and draped in the usual sterile manner. The exact spinal level was  identified using fluoroscopy, and Lidocaine 1 % was injected locally, a # 22 gauge spinal needle was passed to the transverse process. The depth was noted and the needle redirected to pass inferior and approximately one cm anterior to the transverse process.     YES  1 cc of Isovue M-200 was used to verify positioning in the epidural and paravertebral space and outlined the course of the spinal nerve into the epidural space. The same procedure was repeated at each spinal level indicated above. No vascular uptake was identified. A total of 10 mg of preservative free Dexamethasone and 1 cc of Lidocaine/site was slowly injected. The patient tolerated the procedure well. The injection area was cleaned and bandaids applied. Not excessive bleeding was noted. Patient dressed and discharged to home with instructions. Discussion: The patient tolerated the procedure well.                                               Jess Yu MD  July 28, 2020

## 2020-07-28 NOTE — INTERVAL H&P NOTE
Update History & Physical 
 
The Patient's History and Physical of July 13, 2020 was reviewed. There was no change. The surgical site was confirmed by the patient and me. Plan:  The risk, benefits, expected outcome, and alternative to the recommended procedure have been discussed with the patient. Patient understands and wants to proceed with the procedure.  
 
Electronically signed by Janel Faust MD on 7/28/2020 at 8:53 AM

## 2020-09-18 DIAGNOSIS — M51.26 HNP (HERNIATED NUCLEUS PULPOSUS), LUMBAR: ICD-10-CM

## 2020-09-18 RX ORDER — GABAPENTIN 300 MG/1
CAPSULE ORAL
Qty: 270 CAP | Refills: 3 | OUTPATIENT
Start: 2020-09-18

## 2021-05-14 DIAGNOSIS — M51.26 HNP (HERNIATED NUCLEUS PULPOSUS), LUMBAR: ICD-10-CM

## 2021-05-14 RX ORDER — GABAPENTIN 300 MG/1
CAPSULE ORAL
Qty: 270 CAP | Refills: 3 | OUTPATIENT
Start: 2021-05-14

## 2021-07-01 NOTE — PROGRESS NOTES
Manley Shone is a 52 y.o. male who was seen by synchronous (real-time)  technology on 7/2/2021. He  has a history of back pain due to a symptomatic HNP. We last saw Dr. Brittany Felix  Last year. He was to avoid repetitive BLT, continue prn naprosyn and was to increase gabapentin to 300 mg TID. A SNRB L5 was ordered and they discussed surgery as an option. Today, he is doing ok. He ran out of his medication a while back and his pain increased. He would like to start gabapentin and prn naprosyn back up. Denies bladder/bowel dysfunction, saddle paresthesia, weakness, gait disturbance, or other neurological deficit. Pt at this time desires to  continue with current care/proceed with medication evaluation. Treatments patient has tried:  Physical therapy:No  Chiropractor: Yes temporary benefit 4/2020  Doing HEP: Yes; stretches  Non-opioid medications: Yes  Spinal injections: No  Spinal surgery- No.   Last L MRI 5/2020: HNP L lateral recess L4-5  Last L XR 2020: L L5-S1 pseudoarthrosis       PMHx of chronic R knee pain, hyperlipidemia, remote bleeding ulcer (high school), chronic diarrhea.  Pt works as self employed , days and nights. He notes he is busier now than before. Recently lost father, and his foster daughter was adopted. ASSESSMENT  52 y.o. male with back pain. Diagnoses and all orders for this visit:    1. HNP (herniated nucleus pulposus), lumbar,  L L4/5, symptomatic 3/2020  -     gabapentin (NEURONTIN) 100 mg capsule; Take 1 Capsule by mouth three (3) times daily. Max Daily Amount: 300 mg.  -     naproxen (NAPROSYN) 500 mg tablet; Take 1 Tablet by mouth two (2) times daily as needed for Pain. IMPRESSION/PLAN    1) Pt was given information on back exercises. 2) resume gabapentin  3) resume naprosyn prn  4) Mr. Batsheva Mora has a reminder for a \"due or due soon\" health maintenance.  I have asked that he contact his primary care provider, Roby Perez MD, for follow-up on this health maintenance. 5) We have informed patient to notify us for immediate appointment if he has any worsening neurogical symptoms or if an emergency situation presents, then call 911  5) Pt will follow-up in 6 months. Risks and benefits of ongoing  therapy have been reviewed with the patient.  is appropriate. No pain behaviors. Denies thoughts of harming self or others. Pt has a good risk to benefit ratio which allows the pt to function in a home environment without side effects. Assessment & Plan:   Diagnoses and all orders for this visit:    1. HNP (herniated nucleus pulposus), lumbar,  L L4/5, symptomatic 3/2020  -     gabapentin (NEURONTIN) 100 mg capsule; Take 1 Capsule by mouth three (3) times daily. Max Daily Amount: 300 mg.  -     naproxen (NAPROSYN) 500 mg tablet; Take 1 Tablet by mouth two (2) times daily as needed for Pain. Subjective:   Idania Martins was seen for No chief complaint on file. PAST MEDICAL HISTORY  Past Medical History:   Diagnosis Date    Chest pain, unspecified     Other and unspecified hyperlipidemia         MEDICATIONS  Current Outpatient Medications   Medication Sig Dispense Refill    gabapentin (NEURONTIN) 100 mg capsule Take 1 Capsule by mouth three (3) times daily. Max Daily Amount: 300 mg. 270 Capsule 1    naproxen (NAPROSYN) 500 mg tablet Take 1 Tablet by mouth two (2) times daily as needed for Pain. 180 Tablet 0    rizatriptan (MAXALT-MLT) 10 mg disintegrating tablet take 1 tablet by mouth AT START OF HEADACHE, REPEAT 1 TIME IN 2 HOURS IF PAIN PERSIST maximum daily dose of 2      simvastatin (ZOCOR) 40 mg tablet Take 40 mg by mouth nightly.          ALLERGIES  No Known Allergies    SOCIAL HISTORY    Social History     Socioeconomic History    Marital status:      Spouse name: Not on file    Number of children: Not on file    Years of education: Not on file    Highest education level: Not on file   Occupational History    Not on file Tobacco Use    Smoking status: Never Smoker    Smokeless tobacco: Never Used   Substance and Sexual Activity    Alcohol use: Never     Comment: no significant hx     Drug use: Never    Sexual activity: Not on file   Other Topics Concern    Not on file   Social History Narrative    Not on file     Social Determinants of Health     Financial Resource Strain:     Difficulty of Paying Living Expenses:    Food Insecurity:     Worried About Running Out of Food in the Last Year:     [address] in the Last Year:    Transportation Needs:     Lack of Transportation (Medical):  Lack of Transportation (Non-Medical):    Physical Activity:     Days of Exercise per Week:     Minutes of Exercise per Session:    Stress:     Feeling of Stress :    Social Connections:     Frequency of Communication with Friends and Family:     Frequency of Social Gatherings with Friends and Family:     Attends Gnosticism Services:     Active Member of Clubs or Organizations:     Attends Club or Organization Meetings:     Marital Status:    Intimate Partner Violence:     Fear of Current or Ex-Partner:     Emotionally Abused:     Physically Abused:     Sexually Abused:        Pain Scale: /10    Pain Assessment  7/13/2020   Location of Pain Back;Leg   Location Modifiers Right;Left   Severity of Pain 5   Quality of Pain Sharp; Other (Comment)   Quality of Pain Comment stabbing   Duration of Pain Persistent   Frequency of Pain Constant   Aggravating Factors Standing;Walking   Aggravating Factors Comment -   Limiting Behavior Some   Relieving Factors Rest   Relieving Factors Comment sitting   Result of Injury No         ROS      Objective: There were no vitals taken for this visit.      [INSTRUCTIONS:  \"[x]\" Indicates a positive item  \"[]\" Indicates a negative item  -- DELETE ALL ITEMS NOT EXAMINED]    Constitutional: [] Appears well-developed and well-nourished [x] No apparent distress      [] Abnormal -     Mental status: [x] Alert and awake  [x] Oriented to person/place/time [x] Able to follow commands    [] Abnormal -     Eyes:   EOM    []  Normal    [] Abnormal -   Sclera  []  Normal    [] Abnormal -          Discharge []  None visible   [] Abnormal -     HENT: [] Normocephalic, atraumatic  [] Abnormal -     Neck: [] No visualized mass [] Abnormal -     Pulmonary/Chest: [] Respiratory effort normal   [] No visualized signs of difficulty breathing or respiratory distress        [] Abnormal -      Musculoskeletal:   [] Normal gait with no signs of ataxia         [] Normal range of motion of neck        [] Abnormal -      Neurological:        [] No Facial Asymmetry (Cranial nerve 7 motor function) (limited exam due to video visit)          [] No gaze palsy        [] Abnormal -          Skin:        [] No significant exanthematous lesions or discoloration noted on facial skin         [] Abnormal -                    Psychiatric:       [x] Normal Affect [] Abnormal -        [x] No Hallucinations        Due to this being a TeleHealth evaluation, many elements of the physical examination are unable to be assessed. We discussed the expected course, resolution and complications of the diagnosis(es) in detail. Medication risks, benefits, costs, interactions, and alternatives were discussed as indicated. I advised him to contact the office if his condition worsens, changes or fails to improve as anticipated. He expressed understanding with the diagnosis(es) and plan. Consent: Fely Elam, who was seen by synchronous (real-time) technology, and/or his healthcare decision maker, is aware that this patient-initiated, Telehealth encounter on 7/2/2021 is a billable service, with coverage as determined by his insurance carrier. He is aware that he may receive a bill and has provided verbal consent to proceed: Yes.     Fely Elam is a 52 y.o. male who was evaluated by a synchronous (real-time) technology encounter for concerns as above. Patient identification was verified prior to start of the visit. A caregiver was present when appropriate. Due to this being a TeleHealth encounter (During RREWB-51 public [facility] emergency), evaluation of the following organ systems was limited: Vitals/Constitutional/EENT/Resp/CV/GI//MS/Neuro/Skin/Heme-Lymph-Imm. Pursuant to the emergency declaration under the [facility], [facility] waiver authority and the [employer] Act, this Virtual  Visit was conducted, with patient's consent, to reduce the patient's risk of exposure to COVID-19 and provide continuity of care for an established patient. Services were provided through real time synchronous discussion virtually to substitute for in-person clinic visit. Patient verbally consented to this type of visit and that they might get a bill from the billing of this visit. This service was provided through telephone ,  the patient was located at work and  the provider was located at office. There was only the patient participating in the service. Start time [address] [facility].  End [id number].      Amanda Paz NP

## 2021-07-02 ENCOUNTER — VIRTUAL VISIT (OUTPATIENT)
Dept: ORTHOPEDIC SURGERY | Age: 51
End: 2021-07-02
Payer: COMMERCIAL

## 2021-07-02 DIAGNOSIS — M51.26 HNP (HERNIATED NUCLEUS PULPOSUS), LUMBAR: ICD-10-CM

## 2021-07-02 PROCEDURE — 99442 PR PHYS/QHP TELEPHONE EVALUATION 11-20 MIN: CPT | Performed by: NURSE PRACTITIONER

## 2021-07-02 RX ORDER — NAPROXEN 500 MG/1
500 TABLET ORAL
Qty: 180 TABLET | Refills: 0 | Status: SHIPPED | OUTPATIENT
Start: 2021-07-02 | End: 2021-09-21 | Stop reason: SDUPTHER

## 2021-07-02 RX ORDER — GABAPENTIN 100 MG/1
100 CAPSULE ORAL 3 TIMES DAILY
Qty: 270 CAPSULE | Refills: 1 | Status: SHIPPED | OUTPATIENT
Start: 2021-07-02 | End: 2021-12-02 | Stop reason: SDUPTHER

## 2021-09-21 DIAGNOSIS — M51.26 HNP (HERNIATED NUCLEUS PULPOSUS), LUMBAR: ICD-10-CM

## 2021-09-21 NOTE — TELEPHONE ENCOUNTER
Last Visit: 7/2/21 with TRINI Dobson  Next Appointment: none  Previous Refill Encounter(s): 7/2/21 #180    Requested Prescriptions     Pending Prescriptions Disp Refills    naproxen (NAPROSYN) 500 mg tablet 180 Tablet 0     Sig: Take 1 Tablet by mouth two (2) times daily as needed for Pain.

## 2021-09-22 RX ORDER — NAPROXEN 500 MG/1
500 TABLET ORAL
Qty: 180 TABLET | Refills: 0 | Status: SHIPPED | OUTPATIENT
Start: 2021-09-22 | End: 2021-12-15

## 2021-12-02 ENCOUNTER — TELEPHONE (OUTPATIENT)
Dept: ORTHOPEDIC SURGERY | Age: 51
End: 2021-12-02

## 2021-12-02 DIAGNOSIS — M51.26 HNP (HERNIATED NUCLEUS PULPOSUS), LUMBAR: ICD-10-CM

## 2021-12-02 NOTE — TELEPHONE ENCOUNTER
Last Visit: 7/2/21 with NP Lula Batista  Next Appointment: none  Previous Refill Encounter(s): 7/2/21 #270 with 1 refill    Requested Prescriptions     Pending Prescriptions Disp Refills    gabapentin (NEURONTIN) 100 mg capsule 270 Capsule 1     Sig: Take 1 Capsule by mouth three (3) times daily. Max Daily Amount: 300 mg.

## 2021-12-03 RX ORDER — GABAPENTIN 100 MG/1
100 CAPSULE ORAL 3 TIMES DAILY
Qty: 270 CAPSULE | Refills: 0 | Status: SHIPPED | OUTPATIENT
Start: 2021-12-03 | End: 2022-07-14 | Stop reason: ALTCHOICE

## 2022-02-09 DIAGNOSIS — M51.26 HNP (HERNIATED NUCLEUS PULPOSUS), LUMBAR: ICD-10-CM

## 2022-02-09 RX ORDER — GABAPENTIN 100 MG/1
100 CAPSULE ORAL 3 TIMES DAILY
Qty: 270 CAPSULE | Refills: 0 | OUTPATIENT
Start: 2022-02-09

## 2022-02-09 NOTE — TELEPHONE ENCOUNTER
Last Visit: 7/2/21 with TRINI Bunn  Next Appointment: 2/24/22 with MD Kecia London  Previous Refill Encounter(s): 12/3/21 #270    Requested Prescriptions     Pending Prescriptions Disp Refills    gabapentin (NEURONTIN) 100 mg capsule 270 Capsule 0     Sig: Take 1 Capsule by mouth three (3) times daily. Max Daily Amount: 300 mg.

## 2022-02-24 ENCOUNTER — OFFICE VISIT (OUTPATIENT)
Dept: ORTHOPEDIC SURGERY | Age: 52
End: 2022-02-24
Payer: COMMERCIAL

## 2022-02-24 VITALS
TEMPERATURE: 97.4 F | DIASTOLIC BLOOD PRESSURE: 68 MMHG | HEART RATE: 83 BPM | WEIGHT: 243.4 LBS | BODY MASS INDEX: 36.89 KG/M2 | SYSTOLIC BLOOD PRESSURE: 116 MMHG | OXYGEN SATURATION: 97 % | HEIGHT: 68 IN

## 2022-02-24 DIAGNOSIS — M47.816 LUMBAR FACET ARTHROPATHY: ICD-10-CM

## 2022-02-24 DIAGNOSIS — Z86.16 PERSONAL HISTORY OF COVID-19: ICD-10-CM

## 2022-02-24 DIAGNOSIS — M51.26 HNP (HERNIATED NUCLEUS PULPOSUS), LUMBAR: Primary | ICD-10-CM

## 2022-02-24 DIAGNOSIS — G25.81 RESTLESS LEGS: ICD-10-CM

## 2022-02-24 DIAGNOSIS — M62.838 MUSCLE SPASM: ICD-10-CM

## 2022-02-24 PROCEDURE — 99213 OFFICE O/P EST LOW 20 MIN: CPT | Performed by: PHYSICAL MEDICINE & REHABILITATION

## 2022-02-24 RX ORDER — NAPROXEN 500 MG/1
500 TABLET ORAL
Qty: 180 TABLET | Refills: 1 | Status: SHIPPED | OUTPATIENT
Start: 2022-02-24 | End: 2022-07-01 | Stop reason: SDUPTHER

## 2022-02-24 RX ORDER — METHYLPREDNISOLONE 4 MG/1
TABLET ORAL
Qty: 1 DOSE PACK | Refills: 0 | Status: SHIPPED | OUTPATIENT
Start: 2022-02-24 | End: 2022-04-29 | Stop reason: ALTCHOICE

## 2022-02-24 NOTE — PROGRESS NOTES
[facility]  [address] [facility], [facility]   Phone: [phone]  Fax: [phone]    Pt's YOB: 1970    ASSESSMENT   Diagnoses and all orders for this visit:    1. HNP (herniated nucleus pulposus), lumbar,  L L4/5, symptomatic 3/2020    2. Lumbar facet arthropathy  -     methylPREDNISolone (MEDROL DOSEPACK) 4 mg tablet; Per dose pack instructions  -     naproxen (NAPROSYN) 500 mg tablet; Take 1 Tablet by mouth two (2) times daily as needed for Pain. 3. Muscle spasm    4. Restless legs    5. Personal history of COVID-19         IMPRESSION AND PLAN:  Boo Delgado is a 54 y.o. right hand dominant male with history of left leg pain. Pt complains of intermittent left leg pain, significantly relieved since a right L5 SNRB by Dr. Kalpana Acharya in 07/2021. Pt is taking Neurontin 100 mg 4 caps QHS and Naprosyn 500 mg 2 tabs QHS and notes relief when previously taking steroids. 1) Pt was given information on herniated disc exercises. 2) Pt received refills of Naprosyn 500 mg for inflammatory symptoms as needed. 3) Pt was prescribed a Medrol Dosepak to use for an acute flare of pain as needed. 4) Pt was counseled on proper lifting mechanics. 5) I advised the pt to take Naprosyn only 1 tab at a time to safeguard renal function. 6) Pt was advised to discuss vitamin D3 and zinc supplementation and vitamin D blood testing with his PCP. 7) Mr. Tawana Bernabe has a reminder for a \"due or due soon\" health maintenance. I have asked that he contact his primary care provider, Cassy Lugo NP, for follow-up on this health maintenance. 8)  demonstrated consistency with prescribing. 9) Pt will continue to get gabapentin from neurology for his restless legs syndrome  Follow-up and Dispositions    · Return in about 9 months (around 11/24/2022) for Medication follow up.              HISTORY OF PRESENT ILLNESS:  Boo Delgado is a 54 y.o. right hand dominant male with history of left leg pain and presents to the office today for follow up. Of note, pt is currently followed by ESTELA Porter, and was previously followed by Pia Tong NP and Dr. Lizet Blanchard. Pt complains of intermittent left leg pain. Pt is taking Neurontin 100 mg 4 caps QHS and Naprosyn 500 mg 2 tabs QHS. He notes relief when previously taking steroids. Pt confirms previously undergoing a right L5 SNRB by Dr. Lizet Blanchard in 07/2020 with significant and persistent relief, noting that his pain was bilateral before the injection. He notes that he is followed by a sleep clinic for restless leg syndrome. Pt at this time desires to continue with current care. Of note, pt continues to work as a self-employed . He notes that he has contracted COVID-19 twice, in 01/2021 and 01/2022. He states he will be leaving for a 2-week mission trip to [place] on 03/12/2022.       Pain Scale: 0 - No pain/10    PCP: Angel Granados NP     Past Medical History:   Diagnosis Date    Chest pain, unspecified     Lower back pain     Other and unspecified hyperlipidemia         Social History     Socioeconomic History    Marital status:      Spouse name: Not on file    Number of children: Not on file    Years of education: Not on file    Highest education level: Not on file   Occupational History    Not on file   Tobacco Use    Smoking status: Never Smoker    Smokeless tobacco: Never Used   Substance and Sexual Activity    Alcohol use: Never     Comment: no significant hx     Drug use: Never    Sexual activity: Not on file   Other Topics Concern    Not on file   Social History Narrative    Not on file     Social Determinants of Health     Financial Resource Strain:     Difficulty of Paying Living Expenses: Not on file   Food Insecurity:     Worried About Running Out of Food in the Last Year: Not on file    [place] of Food in the Last Year: Not on file   Transportation Needs:     Lack of Transportation (Medical): Not on file    Lack of Transportation (Non-Medical): Not on file   Physical Activity:     Days of Exercise per Week: Not on file    Minutes of Exercise per Session: Not on file   Stress:     Feeling of Stress : Not on file   Social Connections:     Frequency of Communication with Friends and Family: Not on file    Frequency of Social Gatherings with Friends and Family: Not on file    Attends [employer] Services: Not on file    Active Member of [address] [place] [address] or Organizations: Not on file    Attends Club or Organization Meetings: Not on file    Marital Status: Not on file   Intimate Partner Violence:     Fear of Current or Ex-Partner: Not on file    Emotionally Abused: Not on file    Physically Abused: Not on file    Sexually Abused: Not on file   Housing Stability:     Unable to Pay for Housing in the Last Year: Not on file    Number of [place] in the Last Year: Not on file    Unstable Housing in the Last Year: Not on file       Current Outpatient Medications   Medication Sig Dispense Refill    methylPREDNISolone (MEDROL DOSEPACK) 4 mg tablet Per dose pack instructions 1 Dose Pack 0    naproxen (NAPROSYN) 500 mg tablet Take 1 Tablet by mouth two (2) times daily as needed for Pain. 180 Tablet 1    gabapentin (NEURONTIN) 100 mg capsule Take 1 Capsule by mouth three (3) times daily. Max Daily Amount: 300 mg. 270 Capsule 0    rizatriptan (MAXALT-MLT) 10 mg disintegrating tablet take 1 tablet by mouth AT START OF HEADACHE, REPEAT 1 TIME IN 2 HOURS IF PAIN PERSIST maximum daily dose of 2      simvastatin (ZOCOR) 40 mg tablet Take 40 mg by mouth nightly. No Known Allergies      REVIEW OF SYSTEMS    Constitutional: Negative for fever, chills, or weight change. Respiratory: Negative for cough or shortness of breath. Cardiovascular: Negative for chest pain or palpitations.   Gastrointestinal: Negative for acid reflux, change in bowel habits, or constipation. Genitourinary: Negative for dysuria and flank pain. Musculoskeletal: Positive for left leg pain. Skin: Negative for rash. Neurological: Negative for headaches, dizziness, or numbness. Positive for nocturnal restless legs. Endo/Heme/Allergies: Negative for increased bruising. Psychiatric/Behavioral: Negative for difficulty with sleep. As per HPI    PHYSICAL EXAMINATION  Visit Vitals  /68   Pulse 83   Temp 97.4 °F (36.3 °C)   Ht 5' 8\" (1.727 m)   Wt 243 lb 6.4 oz (110.4 kg)   SpO2 97%   BMI 37.01 kg/m²       Constitutional: Awake, alert, and in no acute distress. Neurological: Sensation to light touch is intact. Skin: warm, dry, and intact. Musculoskeletal: No pain with extension, axial loading, or forward flexion. No pain with internal or external rotation of his hips. Negative straight leg raise and slump test bilaterally. Hip Flex  Quads Hamstrings Ankle DF EHL Ankle PF   Right +4/5 +4/5 +4/5 +4/5 +4/5 +4/5   Left +4/5 +4/5 +4/5 +4/5 +4/5 +4/5     IMAGING:    Lumbar spine MRI from 04/24/2020 was personally reviewed with the patient and demonstrated:    FINDINGS:      Alignment: Intact lordosis  Vertebral body height: Normal  Marrow signal: Unremarkable  Disc spaces: Mild narrowing and desiccation of L2-3  Conus: Terminates at T12-L1     Axial imaging correlation:     T12-L1: Patent canal and foramina.      L1-2: Patent canal and foramina.     L2-3: Patent canal and foramina.     L3-4: Patent canal and foramina.     L4-5: There is a left paracentral disc extrusion extending caudad. This impinges  the crossing left L5 nerve as on axial T2 series 6 images 12-13. Minor facet  arthropathy. No central spinal stenosis. The foramina are patent.     L5-S1: Patent canal and foramina.     Other structures: Unremarkable.        IMPRESSION  IMPRESSION:     1.  Small focal left paracentral disc extrusion at L4-5 impinges the left L5  nerve; as outlined above  -Patent canal and foramina    Written by Litzy Peraza, as dictated by Chasidy Garcia MD.  I, Dr. Chasidy Garcia confirm that all documentation is accurate.

## 2022-02-24 NOTE — PATIENT INSTRUCTIONS
Herniated Disc: Exercises  Introduction  Here are some examples of exercises for you to try. The exercises may be suggested for a condition or for rehabilitation. Start each exercise slowly. Ease off the exercises if you start to have pain. You will be told when to start these exercises and which ones will work best for you. How to stay safe  These exercises can help you move easier and feel better. But when you first start doing them, you may have more pain in your back. This is normal. But it is important to pay close attention to your pain during and after each exercise. · Keep doing these exercises if your pain stays the same or moves from your leg and buttock more toward the middle of your spine. Pain moving out of your leg and buttock is a good sign. · Stop doing these exercises if your pain gets worse in your leg and buttock. Stop if you start to have pain in your leg and buttock that you didn't have before. Be sure to do these exercises in the order they appear. Note how your pain changes before you move to the next one. If your pain is much worse right after exercise and stays worse the next day, do not do any of these exercises. How to do the exercises  1. Rest on belly    1. Lie on your stomach, with your head turned to the side. 2. Try to relax your lower back muscles as much as you can. 3. Continue to lie on your stomach for 2 minutes. · Keep your arms beside your body. · If that position bothers your neck, place your hands, one on top of the other, underneath your forehead. This will help support your head and neck. If lying in this position causes or increases pain down your leg, stop this exercise and do not do the next exercises. 2. Press-up back extension    1. Lie on your stomach, with your face down and your elbows tucked into your sides and under your shoulders. 2. Press your elbows down into the floor to raise your upper back.   3. Hold this position for 15 to 30 seconds. 4. Repeat 2 to 4 times. · As you do this, relax your stomach muscles and allow your back to arch without using your back muscles. · Let your low back relax completely as you arch up. Don't let your hips or pelvis come off the floor. Then relax, and return to the start position. Over time, work up to staying in the press-up position for up to 2 minutes. If lying in this position causes or increases pain down your leg, stop this exercise and do not do the next exercises. 3. Full press-up back extension    1. Lie on your stomach with your face down, keeping your elbows tucked into your sides and under your shoulders. 2. Straighten your elbows, and push your upper body up as far as you can. 3. Hold this position for 5 seconds, and then relax. 4. Repeat 10 times. Allow your lower back to sag. Keep your hips, pelvis, and legs relaxed. Each time, try to raise your upper body a little higher and hold your arms a bit straighter. If lying in this position causes or increases pain down your leg, stop this exercise and do not do the next exercises. If you can't do this exercise, you may instead try the backward bend exercise that follows. 4. Backward bend    1. Stand with your feet hip-width apart, and don't lock your knees. 2. Place your hands in the small of your back. 3. Bend backward as far as you can, keeping your knees straight. 4. Repeat 2 to 4 times. Your toes should be pointing forward. Hold this position for 2 to 3 seconds. Then return to your starting position. Each time, try to bend backward a little farther, until you bend backward as far as you can. If standing in this position causes or increases pain down your leg, stop this exercise. Follow-up care is a key part of your treatment and safety. Be sure to make and go to all appointments, and call your doctor if you are having problems. It's also a good idea to know your test results and keep a list of the medicines you take.   Where can you learn more? Go to http://www.gray.com/  Enter Z594 in the search box to learn more about \"Herniated Disc: Exercises. \"  Current as of: July 1, 2021               Content Version: 13.0  © 4762-8793 Healthwise, Incorporated. Care instructions adapted under license by [facility] (which disclaims liability or warranty for this information). If you have questions about a medical condition or this instruction, always ask your healthcare professional. [place] [address] any warranty or liability for your use of this information.

## 2022-02-28 DIAGNOSIS — M51.26 HNP (HERNIATED NUCLEUS PULPOSUS), LUMBAR: ICD-10-CM

## 2022-02-28 NOTE — TELEPHONE ENCOUNTER
Last Visit: 2/24/22 with MD Thony Pruitt  Next Appointment: Jordan Joseph to follow-up in 9 months  Previous Refill Encounter(s): 12/3/21 #270    Requested Prescriptions     Pending Prescriptions Disp Refills    gabapentin (NEURONTIN) 100 mg capsule 270 Capsule 1     Sig: Take 1 Capsule by mouth three (3) times daily. Max Daily Amount: 300 mg.

## 2022-03-01 RX ORDER — GABAPENTIN 100 MG/1
100 CAPSULE ORAL 3 TIMES DAILY
Qty: 270 CAPSULE | Refills: 1 | OUTPATIENT
Start: 2022-03-01

## 2022-03-01 NOTE — TELEPHONE ENCOUNTER
Please verify the dose 100 vs 300 and frequency -- I have conflicting information and then we can renew this -- thanks

## 2022-03-01 NOTE — TELEPHONE ENCOUNTER
Patient contacted and he states that another one of his doctors (sleep center) was taking over his gabapentin. He seemed confused about this so I told him that we would disregard this refill request and if he finds out that the sleep center is not going to take over this RX to let us know. So no further action is necessary.

## 2022-03-19 PROBLEM — E66.01 SEVERE OBESITY (HCC): Status: ACTIVE | Noted: 2020-07-13

## 2022-03-19 PROBLEM — M51.26 HNP (HERNIATED NUCLEUS PULPOSUS), LUMBAR: Status: ACTIVE | Noted: 2020-07-14

## 2022-04-28 ENCOUNTER — OFFICE VISIT (OUTPATIENT)
Dept: ORTHOPEDIC SURGERY | Age: 52
End: 2022-04-28
Payer: COMMERCIAL

## 2022-04-28 VITALS
TEMPERATURE: 98 F | BODY MASS INDEX: 37.92 KG/M2 | HEIGHT: 67 IN | HEART RATE: 80 BPM | OXYGEN SATURATION: 96 % | WEIGHT: 241.6 LBS

## 2022-04-28 DIAGNOSIS — M54.41 ACUTE MIDLINE LOW BACK PAIN WITH BILATERAL SCIATICA: ICD-10-CM

## 2022-04-28 DIAGNOSIS — M54.42 ACUTE MIDLINE LOW BACK PAIN WITH BILATERAL SCIATICA: Primary | ICD-10-CM

## 2022-04-28 DIAGNOSIS — M54.41 ACUTE MIDLINE LOW BACK PAIN WITH BILATERAL SCIATICA: Primary | ICD-10-CM

## 2022-04-28 DIAGNOSIS — M62.838 MUSCLE SPASM: ICD-10-CM

## 2022-04-28 DIAGNOSIS — M51.26 HNP (HERNIATED NUCLEUS PULPOSUS), LUMBAR: ICD-10-CM

## 2022-04-28 DIAGNOSIS — M47.816 LUMBAR FACET ARTHROPATHY: ICD-10-CM

## 2022-04-28 DIAGNOSIS — M54.42 ACUTE MIDLINE LOW BACK PAIN WITH BILATERAL SCIATICA: ICD-10-CM

## 2022-04-28 PROCEDURE — 99214 OFFICE O/P EST MOD 30 MIN: CPT | Performed by: PHYSICAL MEDICINE & REHABILITATION

## 2022-04-28 PROCEDURE — 72110 X-RAY EXAM L-2 SPINE 4/>VWS: CPT | Performed by: PHYSICAL MEDICINE & REHABILITATION

## 2022-04-28 PROCEDURE — 96372 THER/PROPH/DIAG INJ SC/IM: CPT | Performed by: PHYSICAL MEDICINE & REHABILITATION

## 2022-04-28 RX ORDER — KETOROLAC TROMETHAMINE 30 MG/ML
60 INJECTION, SOLUTION INTRAMUSCULAR; INTRAVENOUS ONCE
Status: COMPLETED | OUTPATIENT
Start: 2022-04-28 | End: 2022-04-28

## 2022-04-28 RX ORDER — PREDNISONE 10 MG/1
TABLET ORAL
Qty: 32 TABLET | Refills: 0 | Status: SHIPPED | OUTPATIENT
Start: 2022-04-28 | End: 2022-04-29 | Stop reason: ALTCHOICE

## 2022-04-28 RX ORDER — PRAZOSIN HYDROCHLORIDE 1 MG/1
CAPSULE ORAL
COMMUNITY
Start: 2022-04-13

## 2022-04-28 RX ORDER — GABAPENTIN 300 MG/1
300 CAPSULE ORAL
COMMUNITY
Start: 2022-04-13 | End: 2022-07-01 | Stop reason: SDUPTHER

## 2022-04-28 RX ADMIN — KETOROLAC TROMETHAMINE 60 MG: 30 INJECTION, SOLUTION INTRAMUSCULAR; INTRAVENOUS at 13:59

## 2022-04-28 NOTE — PROGRESS NOTES
[facility]  [address] [facility]– [place], [facility]   Phone: [phone]  Fax: [phone]    Pt's YOB: 1970    ASSESSMENT   Diagnoses and all orders for this visit:    1. Acute midline low back pain with bilateral sciatica  -     AMB POC XRAY, SPINE, LUMBOSACRAL; 4+  -     MRI LUMB SPINE WO CONT; Future  -     predniSONE (DELTASONE) 10 mg tablet; 6 pills Day 1, 5 pills Day 2, 4 pills Day 3&4, 3 pills Day 5&6, 2 pills Day 7&8, 1 pill Day 9&10, 1/2 pill Day 11&12  -     ketorolac tromethamine (TORADOL) 60 mg/2 mL injection 60 mg  -     THER/PROPH/DIAG INJECTION, SUBCUT/IM    2. HNP (herniated nucleus pulposus), lumbar,  L L4/5, symptomatic 3/2020  -     MRI LUMB SPINE WO CONT; Future    3. Lumbar facet arthropathy  -     MRI LUMB SPINE WO CONT; Future    4. Muscle spasm  -     MRI LUMB SPINE WO CONT; Future         IMPRESSION AND PLAN:  Joshua Rocha is a 54 y.o. right hand dominant male with history of left lower extremity pain. Pt complains of return of his lumbar pain radiating down the posterior aspect of the left leg to the heel and down the posterior aspect of the right thigh. Pt is taking Neurontin 100 mg 3-4 caps nightly and notes no relief with Naprosyn 500 mg or a Medrol Dosepak. 1) Pt was given information on herniated disc exercises. 2) Pt's dosage of Neurontin was increased to 300 mg 4 caps daily as directed. Pt does not require refills at this time. 3) Pt was prescribed a large prednisone taper. Pt was advised to discontinue NSAID's while taking the steroids. 4) Lumbar spine 4V x-rays were obtained at today's office visit. 5) A lumbar MRI was ordered to assess for disc herniation-- considering steroid injections. 6) Pt received a 60 mg Toradol injection in the office today. Pt was advised not to use NSAID's for the rest of the day. 7) Mr. Lilly Landry has a reminder for a \"due or due soon\" health maintenance.  I have asked that he contact his primary care provider, Jane Mcgowan NP, for follow-up on this health maintenance. 8)  demonstrated consistency with prescribing. Follow-up and Dispositions    · Return in about 3 weeks (around 5/19/2022) for Diagnostic Test follow up, Medication follow up. HISTORY OF PRESENT ILLNESS:  Rolena Goltz is a 54 y.o. right hand dominant male with history of left lower extremity pain and presents to the office today for medication follow up, accompanied by his wife. Pt complains of return of his lumbar pain radiating down the posterior aspect of the left leg to the heel and down the posterior aspect of the right thigh. He notes that he and his wife traveled to [place] for 2 weeks, returning on 03/24/2022, and his pain returned while on the trip and became severe on the 13.5-hour return flight. He denies any lower extremity weakness or foot dragging. Pt is taking Neurontin 100 mg 3-4 caps nightly and notes no relief with Naprosyn 500 mg or a Medrol Dosepak. Pt's wife states that pt has not begun taking Neurontin 300 mg capsules prescribed by Dr. Agnes Whiting. Pt notes relief with a prior lumbar steroid injection but denies any prior lumbar surgeries. He states that his prior MRI showed a broken disc fragment causing nerve impingement. Pt at this time desires to proceed with medication evaluation and a lumbar MRI.     Pain Scale: 6/10    PCP: Jane Mcgowan NP     Past Medical History:   Diagnosis Date    Chest pain, unspecified     Lower back pain     Other and unspecified hyperlipidemia         Social History     Socioeconomic History    Marital status:      Spouse name: Not on file    Number of children: Not on file    Years of education: Not on file    Highest education level: Not on file   Occupational History    Not on file   Tobacco Use    Smoking status: Never Smoker    Smokeless tobacco: Never Used   Substance and Sexual Activity    Alcohol use: Never     Comment: no significant hx     Drug use: Never    Sexual activity: Not on file   Other Topics Concern    Not on file   Social History Narrative    Not on file     Social Determinants of Health     Financial Resource Strain:     Difficulty of Paying Living Expenses: Not on file   Food Insecurity:     Worried About Running Out of Food in the Last Year: Not on file    [place] of Food in the Last Year: Not on file   Transportation Needs:     Lack of Transportation (Medical): Not on file    Lack of Transportation (Non-Medical): Not on file   Physical Activity:     Days of Exercise per Week: Not on file    Minutes of Exercise per Session: Not on file   Stress:     Feeling of Stress : Not on file   Social Connections:     Frequency of Communication with Friends and Family: Not on file    Frequency of Social Gatherings with Friends and Family: Not on file    Attends Confucianist Services: Not on file    Active Member of [address] [place] [address] or Organizations: Not on file    Attends Club or Organization Meetings: Not on file    Marital Status: Not on file   Intimate Partner Violence:     Fear of Current or Ex-Partner: Not on file    Emotionally Abused: Not on file    Physically Abused: Not on file    Sexually Abused: Not on file   Housing Stability:     Unable to Pay for Housing in the Last Year: Not on file    Number of [place] in the Last Year: Not on file    Unstable Housing in the Last Year: Not on file       Current Outpatient Medications   Medication Sig Dispense Refill    prazosin (MINIPRESS) 1 mg capsule       predniSONE (DELTASONE) 10 mg tablet 6 pills Day 1, 5 pills Day 2, 4 pills Day 3&4, 3 pills Day 5&6, 2 pills Day 7&8, 1 pill Day 9&10, 1/2 pill Day 11&12 32 Tablet 0    naproxen (NAPROSYN) 500 mg tablet Take 1 Tablet by mouth two (2) times daily as needed for Pain. 180 Tablet 1    gabapentin (NEURONTIN) 100 mg capsule Take 1 Capsule by mouth three (3) times daily.  Max Daily Amount: 300 mg. (Patient taking differently: Take 100 mg by mouth. 4 caps Bedtime) 270 Capsule 0    rizatriptan (MAXALT-MLT) 10 mg disintegrating tablet take 1 tablet by mouth AT START OF HEADACHE, REPEAT 1 TIME IN 2 HOURS IF PAIN PERSIST maximum daily dose of 2      simvastatin (ZOCOR) 40 mg tablet Take 40 mg by mouth nightly.  gabapentin (NEURONTIN) 300 mg capsule Take 300 mg by mouth. 3-4 caps po daily ramp (Patient not taking: Reported on 4/28/2022)      methylPREDNISolone (MEDROL DOSEPACK) 4 mg tablet Per dose pack instructions 1 Dose Pack 0       No Known Allergies      REVIEW OF SYSTEMS    Constitutional: Negative for fever, chills, or weight change. Respiratory: Negative for cough or shortness of breath. Cardiovascular: Negative for chest pain or palpitations. Gastrointestinal: Negative for acid reflux, change in bowel habits, or constipation. Genitourinary: Negative for dysuria and flank pain. Musculoskeletal: Positive for lumbar, left leg and foot, and right thigh pain. Skin: Negative for rash. Neurological: Negative for headaches, dizziness, or numbness. Endo/Heme/Allergies: Negative for increased bruising. Psychiatric/Behavioral: Negative for difficulty with sleep. As per HPI    PHYSICAL EXAMINATION  Visit Vitals  Pulse 80   Temp 98 °F (36.7 °C) (Skin)   Ht 5' 7\" (1.702 m)   Wt 241 lb 9.6 oz (109.6 kg)   SpO2 96%   BMI 37.84 kg/m²       Constitutional: Awake, alert, and in no acute distress. Neurological: Sensation to light touch is intact. Skin: warm, dry, and intact. Musculoskeletal: Pain with extension. Pain and limited range of motion with forward flexion. No pain with internal or external rotation of his hips. Positive straight leg raise bilaterally.      Hip Flex  Quads Hamstrings Ankle DF EHL Ankle PF   Right +4/5 +4/5 +4/5 +4/5 +4/5 +4/5   Left +4/5 +4/5 +4/5 +4/5 +4/5 +4/5     TORADOL INJECTION:  Administrations This Visit     ketorolac tromethamine (TORADOL) 60 mg/2 mL injection 60 mg     Admin Date  04/28/2022  13:59 Action  Given Dose  60 mg Route  IntraMUSCular Site  Right Gluteus Jay Administered By  RT Gonzalez    NDC: 47887-500-81    Patient Supplied?: No                 IMAGING:    Lumbar spine 4V x-rays from 04/28/2022 were reviewed and demonstrated:   Degenerative disc L5-S1. Multilevel degenerative facet. No instability. Mild listhesis at L4-5. Lumbar spine MRI from 04/24/2020 was personally reviewed with the patient and demonstrated:     FINDINGS:      Alignment: Intact lordosis  Vertebral body height: Normal  Marrow signal: Unremarkable  Disc spaces: Mild narrowing and desiccation of L2-3  Conus: Terminates at T12-L1     Axial imaging correlation:     T12-L1: Patent canal and foramina.      L1-2: Patent canal and foramina.     L2-3: Patent canal and foramina.     L3-4: Patent canal and foramina.     L4-5: There is a left paracentral disc extrusion extending caudad. This impinges  the crossing left L5 nerve as on axial T2 series 6 images 12-13. Minor facet  arthropathy. No central spinal stenosis. The foramina are patent.     L5-S1: Patent canal and foramina.     Other structures: Unremarkable.        IMPRESSION  IMPRESSION:     1. Small focal left paracentral disc extrusion at L4-5 impinges the left L5  nerve; as outlined above  -Patent canal and foramina       Written by Connor Solares, as dictated by Maliha Pierre MD.  I, Dr. Maliha Pierre confirm that all documentation is accurate.

## 2022-04-28 NOTE — PATIENT INSTRUCTIONS
Herniated Disc: Exercises  Introduction  Here are some examples of exercises for you to try. The exercises may be suggested for a condition or for rehabilitation. Start each exercise slowly. Ease off the exercises if you start to have pain. You will be told when to start these exercises and which ones will work best for you. How to stay safe  These exercises can help you move easier and feel better. But when you first start doing them, you may have more pain in your back. This is normal. But it is important to pay close attention to your pain during and after each exercise. · Keep doing these exercises if your pain stays the same or moves from your leg and buttock more toward the middle of your spine. Pain moving out of your leg and buttock is a good sign. · Stop doing these exercises if your pain gets worse in your leg and buttock. Stop if you start to have pain in your leg and buttock that you didn't have before. Be sure to do these exercises in the order they appear. Note how your pain changes before you move to the next one. If your pain is much worse right after exercise and stays worse the next day, do not do any of these exercises. How to do the exercises  1. Rest on belly    1. Lie on your stomach, with your head turned to the side. 2. Try to relax your lower back muscles as much as you can. 3. Continue to lie on your stomach for 2 minutes. · Keep your arms beside your body. · If that position bothers your neck, place your hands, one on top of the other, underneath your forehead. This will help support your head and neck. If lying in this position causes or increases pain down your leg, stop this exercise and do not do the next exercises. 2. Press-up back extension    1. Lie on your stomach, with your face down and your elbows tucked into your sides and under your shoulders. 2. Press your elbows down into the floor to raise your upper back.   3. Hold this position for 15 to 30 seconds. 4. Repeat 2 to 4 times. · As you do this, relax your stomach muscles and allow your back to arch without using your back muscles. · Let your low back relax completely as you arch up. Don't let your hips or pelvis come off the floor. Then relax, and return to the start position. Over time, work up to staying in the press-up position for up to 2 minutes. If lying in this position causes or increases pain down your leg, stop this exercise and do not do the next exercises. 3. Full press-up back extension    1. Lie on your stomach with your face down, keeping your elbows tucked into your sides and under your shoulders. 2. Straighten your elbows, and push your upper body up as far as you can. 3. Hold this position for 5 seconds, and then relax. 4. Repeat 10 times. Allow your lower back to sag. Keep your hips, pelvis, and legs relaxed. Each time, try to raise your upper body a little higher and hold your arms a bit straighter. If lying in this position causes or increases pain down your leg, stop this exercise and do not do the next exercises. If you can't do this exercise, you may instead try the backward bend exercise that follows. 4. Backward bend    1. Stand with your feet hip-width apart, and don't lock your knees. 2. Place your hands in the small of your back. 3. Bend backward as far as you can, keeping your knees straight. 4. Repeat 2 to 4 times. Your toes should be pointing forward. Hold this position for 2 to 3 seconds. Then return to your starting position. Each time, try to bend backward a little farther, until you bend backward as far as you can. If standing in this position causes or increases pain down your leg, stop this exercise. Follow-up care is a key part of your treatment and safety. Be sure to make and go to all appointments, and call your doctor if you are having problems. It's also a good idea to know your test results and keep a list of the medicines you take.   Where can you learn more? Go to http://regina-les.info/  Enter Z594 in the search box to learn more about \"Herniated Disc: Exercises. \"  Current as of: July 1, 2021               Content Version: 13.2  © 7883-7184 Healthwise, Incorporated. Care instructions adapted under license by [employer] (which disclaims liability or warranty for this information). If you have questions about a medical condition or this instruction, always ask your healthcare professional. [place] [address] any warranty or liability for your use of this information.

## 2022-05-13 ENCOUNTER — HOSPITAL ENCOUNTER (OUTPATIENT)
Age: 52
Discharge: HOME | End: 2022-05-13
Attending: PHYSICAL MEDICINE & REHABILITATION
Payer: COMMERCIAL

## 2022-05-13 PROCEDURE — 72148 MRI LUMBAR SPINE W/O DYE: CPT

## 2022-05-18 ENCOUNTER — OFFICE VISIT (OUTPATIENT)
Dept: ORTHOPEDIC SURGERY | Age: 52
End: 2022-05-18
Payer: COMMERCIAL

## 2022-05-18 VITALS
SYSTOLIC BLOOD PRESSURE: 116 MMHG | WEIGHT: 244 LBS | TEMPERATURE: 96 F | DIASTOLIC BLOOD PRESSURE: 77 MMHG | HEIGHT: 67 IN | OXYGEN SATURATION: 98 % | HEART RATE: 82 BPM | BODY MASS INDEX: 38.3 KG/M2 | RESPIRATION RATE: 16 BRPM

## 2022-05-18 DIAGNOSIS — M62.838 MUSCLE SPASM: ICD-10-CM

## 2022-05-18 DIAGNOSIS — M51.26 HNP (HERNIATED NUCLEUS PULPOSUS), LUMBAR: ICD-10-CM

## 2022-05-18 DIAGNOSIS — M54.16 LEFT LUMBAR RADICULITIS: Primary | ICD-10-CM

## 2022-05-18 DIAGNOSIS — M47.816 LUMBAR FACET ARTHROPATHY: ICD-10-CM

## 2022-05-18 PROCEDURE — 99214 OFFICE O/P EST MOD 30 MIN: CPT | Performed by: PHYSICAL MEDICINE & REHABILITATION

## 2022-05-18 RX ORDER — METHYLPREDNISOLONE 4 MG/1
TABLET ORAL
Qty: 1 DOSE PACK | Refills: 0 | Status: ON HOLD | OUTPATIENT
Start: 2022-05-18 | End: 2022-05-31 | Stop reason: ALTCHOICE

## 2022-05-18 NOTE — PATIENT INSTRUCTIONS
Herniated Disc: Exercises  Introduction  Here are some examples of exercises for you to try. The exercises may be suggested for a condition or for rehabilitation. Start each exercise slowly. Ease off the exercises if you start to have pain. You will be told when to start these exercises and which ones will work best for you. How to stay safe  These exercises can help you move easier and feel better. But when you first start doing them, you may have more pain in your back. This is normal. But it is important to pay close attention to your pain during and after each exercise. · Keep doing these exercises if your pain stays the same or moves from your leg and buttock more toward the middle of your spine. Pain moving out of your leg and buttock is a good sign. · Stop doing these exercises if your pain gets worse in your leg and buttock. Stop if you start to have pain in your leg and buttock that you didn't have before. Be sure to do these exercises in the order they appear. Note how your pain changes before you move to the next one. If your pain is much worse right after exercise and stays worse the next day, do not do any of these exercises. How to do the exercises  1. Rest on belly    1. Lie on your stomach, with your head turned to the side. 2. Try to relax your lower back muscles as much as you can. 3. Continue to lie on your stomach for 2 minutes. · Keep your arms beside your body. · If that position bothers your neck, place your hands, one on top of the other, underneath your forehead. This will help support your head and neck. If lying in this position causes or increases pain down your leg, stop this exercise and do not do the next exercises. 2. Press-up back extension    1. Lie on your stomach, with your face down and your elbows tucked into your sides and under your shoulders. 2. Press your elbows down into the floor to raise your upper back.   3. Hold this position for 15 to 30 seconds. 4. Repeat 2 to 4 times. · As you do this, relax your stomach muscles and allow your back to arch without using your back muscles. · Let your low back relax completely as you arch up. Don't let your hips or pelvis come off the floor. Then relax, and return to the start position. Over time, work up to staying in the press-up position for up to 2 minutes. If lying in this position causes or increases pain down your leg, stop this exercise and do not do the next exercises. 3. Full press-up back extension    1. Lie on your stomach with your face down, keeping your elbows tucked into your sides and under your shoulders. 2. Straighten your elbows, and push your upper body up as far as you can. 3. Hold this position for 5 seconds, and then relax. 4. Repeat 10 times. Allow your lower back to sag. Keep your hips, pelvis, and legs relaxed. Each time, try to raise your upper body a little higher and hold your arms a bit straighter. If lying in this position causes or increases pain down your leg, stop this exercise and do not do the next exercises. If you can't do this exercise, you may instead try the backward bend exercise that follows. 4. Backward bend    1. Stand with your feet hip-width apart, and don't lock your knees. 2. Place your hands in the small of your back. 3. Bend backward as far as you can, keeping your knees straight. 4. Repeat 2 to 4 times. Your toes should be pointing forward. Hold this position for 2 to 3 seconds. Then return to your starting position. Each time, try to bend backward a little farther, until you bend backward as far as you can. If standing in this position causes or increases pain down your leg, stop this exercise. Follow-up care is a key part of your treatment and safety. Be sure to make and go to all appointments, and call your doctor if you are having problems. It's also a good idea to know your test results and keep a list of the medicines you take.   Where can you learn more? Go to http://www.gray.com/  Enter Z594 in the search box to learn more about \"Herniated Disc: Exercises. \"  Current as of: July 1, 2021               Content Version: 13.2  © 3773-1761 Healthwise, Incorporated. Care instructions adapted under license by [employer] (which disclaims liability or warranty for this information). If you have questions about a medical condition or this instruction, always ask your healthcare professional. [place] [address] any warranty or liability for your use of this information.

## 2022-05-18 NOTE — PROGRESS NOTES
Chief Complaint   Patient presents with    Follow-up     MRI       Pt preferred language for health care discussion is english. Is someone accompanying this pt? no    Is the patient using any DME equipment during [address]? no    Depression Screening:  3 most recent PHQ Screens 5/12/2020   Little interest or pleasure in doing things Not at all   Feeling down, depressed, irritable, or hopeless Not at all   Total Score PHQ 2 0       Learning Assessment:  No flowsheet data found. Health Maintenance reviewed and discussed per provider. Yes        Advance Directive:  1. Do you have an advance directive in place? Patient Reply:no    2. If not, would you like material regarding how to put one in place? Patient Reply: no    Coordination of Care:  1. Have you been to the ER, urgent care clinic since your last visit? Hospitalized since your last visit? no    2. Have you seen or consulted any other health care providers outside of the [address] [place] [address] since your last visit? Include any pap smears or colon screening.  no

## 2022-05-18 NOTE — PROGRESS NOTES
[facility]  [address] [place], [facility] [address]  Phone: [phone]  Fax: [phone]    Pt's YOB: 1970    ASSESSMENT   Diagnoses and all orders for this visit:    1. Left lumbar radiculitis  -     methylPREDNISolone (MEDROL DOSEPACK) 4 mg tablet; Per dose pack instructions  -     SCHEDULE SURGERY    2. HNP (herniated nucleus pulposus), lumbar  -     SCHEDULE SURGERY    3. Lumbar facet arthropathy  -     SCHEDULE SURGERY    4. Muscle spasm         IMPRESSION AND PLAN:  Alexi Trejo is a 54 y.o. right hand dominant male with history of left lower extremity pain. He complains of continued lumbar pain radiating down the left lower extremity to the ankle, progressive since his last office visit. Pt is taking Neurontin 300 mg 4 caps daily as directed. 1) Pt was given information on herniated disc exercises. 2) A left L4, L5 SNRB was ordered with Dr Arik Chacon for lumbar radicular symptoms. 3) Pt was informed that he can increase his dosage of Neurontin to 300 mg 2 caps TID or 3 caps BID if needed. No refills are required at this time. 4) Pt was prescribed a Medrol Dosepak for acute inflammatory pain. 5) Pt was counseled on proper lifting mechanics and to avoid prolonged sitting. 6) I advised the pt to wear a lumbar support intermittently during activities that stress the lumbar spine. 7) Mr. Kehinde Polo has a reminder for a \"due or due soon\" health maintenance. I have asked that he contact his primary care provider, Love Harrington NP, for follow-up on this health maintenance. 8)  demonstrated consistency with prescribing. Follow-up and Dispositions    · Return in about 6 weeks (around 6/29/2022) for Medication follow up, injection follow up.              HISTORY OF PRESENT ILLNESS:  Alexi Trejo is a 54 y.o. right hand dominant male with history of left lower extremity pain and presents to the office today for MRI review and medication follow up, accompanied by his wife. Pt complains of continued lumbar pain radiating down the left lower extremity to the ankle, progressive since his last office visit. He denies any lower extremity weakness and states that his pain is mostly in the left sural and popliteal region and is most severe with sitting erect, driving, and bending forward. Pt states that he was working as a  on his trip to [place] but at the same level of strenuousness as on his ordinary job. He mentions he spent 13.5 hours on an airplane for the return flight, though the flight itself took only 8 hours. He also notes that his pain is similar in severity to that from a similar episode 2 years ago, during which he underwent chiropractic adjustments with transient relief x a few hours. Pt is taking Naprosyn 500 mg 1 tab as needed and Neurontin 300 mg 2 caps BID without excessive sedation and expresses interest in increasing his dosage of Neurontin. He notes relief with prior steroid injections by Dr. Ailyn Gannon. Pt at this time desires to proceed with steroid injections.     Pain Scale: 5/10    PCP: John Minaya NP     Past Medical History:   Diagnosis Date    Chest pain, unspecified     Lower back pain     Other and unspecified hyperlipidemia         Social History     Socioeconomic History    Marital status:      Spouse name: Not on file    Number of children: Not on file    Years of education: Not on file    Highest education level: Not on file   Occupational History    Not on file   Tobacco Use    Smoking status: Never Smoker    Smokeless tobacco: Never Used   Substance and Sexual Activity    Alcohol use: Never     Comment: no significant hx     Drug use: Never    Sexual activity: Not on file   Other Topics Concern    Not on file   Social History Narrative    Not on file     Social Determinants of Health     Financial Resource Strain:     Difficulty of Paying Living Expenses: Not on file   Food Insecurity:     Worried About Running Out of Food in the Last Year: Not on file    Bab[place]unde of Food in the Last Year: Not on file   Transportation Needs:     Lack of Transportation (Medical): Not on file    Lack of Transportation (Non-Medical): Not on file   Physical Activity:     Days of Exercise per Week: Not on file    Minutes of Exercise per Session: Not on file   Stress:     Feeling of Stress : Not on file   Social Connections:     Frequency of Communication with Friends and Family: Not on file    Frequency of Social Gatherings with Friends and Family: Not on file    Attends Worship Services: Not on file    Active Member of [address] [place] [address] [employer] or Organizations: Not on file    Attends Club or Organization Meetings: Not on file    Marital Status: Not on file   Intimate Partner Violence:     Fear of Current or Ex-Partner: Not on file    Emotionally Abused: Not on file    Physically Abused: Not on file    Sexually Abused: Not on file   Housing Stability:     Unable to Pay for Housing in the Last Year: Not on file    Number of [place] in the Last Year: Not on file    Unstable Housing in the Last Year: Not on file       Current Outpatient Medications   Medication Sig Dispense Refill    methylPREDNISolone (MEDROL DOSEPACK) 4 mg tablet Per dose pack instructions 1 Dose Pack 0    prazosin (MINIPRESS) 1 mg capsule       gabapentin (NEURONTIN) 300 mg capsule Take 300 mg by mouth. 3-4 caps po daily ramp      naproxen (NAPROSYN) 500 mg tablet Take 1 Tablet by mouth two (2) times daily as needed for Pain. 180 Tablet 1    gabapentin (NEURONTIN) 100 mg capsule Take 1 Capsule by mouth three (3) times daily. Max Daily Amount: 300 mg. (Patient taking differently: Take 100 mg by mouth.  4 caps Bedtime) 270 Capsule 0    rizatriptan (MAXALT-MLT) 10 mg disintegrating tablet take 1 tablet by mouth AT START OF HEADACHE, REPEAT 1 TIME IN 2 HOURS IF PAIN PERSIST maximum daily dose of 2      simvastatin (ZOCOR) 40 mg tablet Take 40 mg by mouth nightly. No Known Allergies      REVIEW OF SYSTEMS    Constitutional: Negative for fever, chills, or weight change. Respiratory: Negative for cough or shortness of breath. Cardiovascular: Negative for chest pain or palpitations. Gastrointestinal: Negative for acid reflux, change in bowel habits, or constipation. Genitourinary: Negative for dysuria and flank pain. Musculoskeletal: Positive for lumbar and left thigh, popliteal, calf, and ankle pain. Skin: Negative for rash. Neurological: Negative for headaches, dizziness, or numbness. Endo/Heme/Allergies: Negative for increased bruising. Psychiatric/Behavioral: Negative for difficulty with sleep. As per HPI    PHYSICAL EXAMINATION  Visit Vitals  /77 (BP 1 Location: Right arm, BP Patient Position: Sitting, BP Cuff Size: Adult)   Pulse 82   Temp (!) 96 °F (35.6 °C) (Tympanic)   Resp 16   Ht 5' 7\" (1.702 m)   Wt 244 lb (110.7 kg)   SpO2 98%   BMI 38.22 kg/m²       Constitutional: Awake, alert, and in no acute distress. Neurological: Sensation to light touch is intact. Skin: warm, dry, and intact. Musculoskeletal: Mild tenderness to palpation over the midline lumbar region. Mild pain with extension, axial loading, and forward flexion. No pain with internal or external rotation of his hips. Positive straight leg raise on the left. Hip Flex  Quads Hamstrings Ankle DF EHL Ankle PF   Right +4/5 +4/5 +4/5 +4/5 +4/5 +4/5   Left +4/5 +4/5 +4/5 +4/5 +4/5 +4/5     IMAGING:    Lumbar spine MRI from 05/13/2022 was personally reviewed with the patient and his wife and demonstrated:    Trace retrolisthesis L4. No spondylolysis. No compression fracture. No  pathologic marrow signal. Hemangioma in L2. Conus medullaris ends at T12 with  normal morphology and signal intensity. Paraspinous soft tissues unremarkable.     Sagittal images show mild posterior disc bulge at T10-T11. No central stenosis. No foraminal stenosis. Suspect chronic Schmorl's node formation in the posterior  inferior endplate of Y18     H7-Y1, L2-L3, and L3-L4: No disc herniation, central or foraminal stenosis.     L4-L5: Slightly smaller left paracentral/posterior lateral disc  protrusion, with less inferior migration. Still, compression of the left lateral  recess, descending L5 nerve root. No overall central stenosis. With facet  hypertrophy , mild bilateral foraminal stenosis.     L5-S1: No disc herniation or central stenosis. Mild foraminal hypertrophy with  no foraminal stenosis.     IMPRESSION  1. Slightly smaller left posterolateral disc protrusion at L4-L5, still with  compression of the left descending L5 nerve root. 2. Stable study otherwise. Written by Waldo Richardson, as dictated by Rios Chen MD.  I, Dr. Rios Chen confirm that all documentation is accurate.

## 2022-05-18 NOTE — H&P (VIEW-ONLY)
[facility]  [address] [place], [facility] [address]  Phone: [phone]  Fax: [phone]    Pt's YOB: 1970    ASSESSMENT   Diagnoses and all orders for this visit:    1. Left lumbar radiculitis  -     methylPREDNISolone (MEDROL DOSEPACK) 4 mg tablet; Per dose pack instructions  -     SCHEDULE SURGERY    2. HNP (herniated nucleus pulposus), lumbar  -     SCHEDULE SURGERY    3. Lumbar facet arthropathy  -     SCHEDULE SURGERY    4. Muscle spasm         IMPRESSION AND PLAN:  Srikanth Mendez is a 54 y.o. right hand dominant male with history of left lower extremity pain. He complains of continued lumbar pain radiating down the left lower extremity to the ankle, progressive since his last office visit. Pt is taking Neurontin 300 mg 4 caps daily as directed. 1) Pt was given information on herniated disc exercises. 2) A left L4, L5 SNRB was ordered with Dr Ashanti Arora for lumbar radicular symptoms. 3) Pt was informed that he can increase his dosage of Neurontin to 300 mg 2 caps TID or 3 caps BID if needed. No refills are required at this time. 4) Pt was prescribed a Medrol Dosepak for acute inflammatory pain. 5) Pt was counseled on proper lifting mechanics and to avoid prolonged sitting. 6) I advised the pt to wear a lumbar support intermittently during activities that stress the lumbar spine. 7) Mr. Fei Delgado has a reminder for a \"due or due soon\" health maintenance. I have asked that he contact his primary care provider, Som Cr NP, for follow-up on this health maintenance. 8)  demonstrated consistency with prescribing. Follow-up and Dispositions    · Return in about 6 weeks (around 6/29/2022) for Medication follow up, injection follow up.              HISTORY OF PRESENT ILLNESS:  Srikanth Mendez is a 54 y.o. right hand dominant male with history of left lower extremity pain and presents to the office today for MRI review and medication follow up, accompanied by his wife. Pt complains of continued lumbar pain radiating down the left lower extremity to the ankle, progressive since his last office visit. He denies any lower extremity weakness and states that his pain is mostly in the left sural and popliteal region and is most severe with sitting erect, driving, and bending forward. Pt states that he was working as a  on his trip to [place] but at the same level of strenuousness as on his ordinary job. He mentions he spent 13.5 hours on an airplane for the return flight, though the flight itself took only 8 hours. He also notes that his pain is similar in severity to that from a similar episode 2 years ago, during which he underwent chiropractic adjustments with transient relief x a few hours. Pt is taking Naprosyn 500 mg 1 tab as needed and Neurontin 300 mg 2 caps BID without excessive sedation and expresses interest in increasing his dosage of Neurontin. He notes relief with prior steroid injections by Dr. Julio Salgado. Pt at this time desires to proceed with steroid injections.     Pain Scale: 5/10    PCP: Riri Calderon NP     Past Medical History:   Diagnosis Date    Chest pain, unspecified     Lower back pain     Other and unspecified hyperlipidemia         Social History     Socioeconomic History    Marital status:      Spouse name: Not on file    Number of children: Not on file    Years of education: Not on file    Highest education level: Not on file   Occupational History    Not on file   Tobacco Use    Smoking status: Never Smoker    Smokeless tobacco: Never Used   Substance and Sexual Activity    Alcohol use: Never     Comment: no significant hx     Drug use: Never    Sexual activity: Not on file   Other Topics Concern    Not on file   Social History Narrative    Not on file     Social Determinants of Health     Financial Resource Strain:     Difficulty of Paying Living Expenses: Not on file   Food Insecurity:     Worried About Running Out of Food in the Last Year: Not on file    Bab[place]unde of Food in the Last Year: Not on file   Transportation Needs:     Lack of Transportation (Medical): Not on file    Lack of Transportation (Non-Medical): Not on file   Physical Activity:     Days of Exercise per Week: Not on file    Minutes of Exercise per Session: Not on file   Stress:     Feeling of Stress : Not on file   Social Connections:     Frequency of Communication with Friends and Family: Not on file    Frequency of Social Gatherings with Friends and Family: Not on file    Attends Baptism Services: Not on file    Active Member of [address] [place] [address] [employer] or Organizations: Not on file    Attends Club or Organization Meetings: Not on file    Marital Status: Not on file   Intimate Partner Violence:     Fear of Current or Ex-Partner: Not on file    Emotionally Abused: Not on file    Physically Abused: Not on file    Sexually Abused: Not on file   Housing Stability:     Unable to Pay for Housing in the Last Year: Not on file    Number of [place] in the Last Year: Not on file    Unstable Housing in the Last Year: Not on file       Current Outpatient Medications   Medication Sig Dispense Refill    methylPREDNISolone (MEDROL DOSEPACK) 4 mg tablet Per dose pack instructions 1 Dose Pack 0    prazosin (MINIPRESS) 1 mg capsule       gabapentin (NEURONTIN) 300 mg capsule Take 300 mg by mouth. 3-4 caps po daily ramp      naproxen (NAPROSYN) 500 mg tablet Take 1 Tablet by mouth two (2) times daily as needed for Pain. 180 Tablet 1    gabapentin (NEURONTIN) 100 mg capsule Take 1 Capsule by mouth three (3) times daily. Max Daily Amount: 300 mg. (Patient taking differently: Take 100 mg by mouth.  4 caps Bedtime) 270 Capsule 0    rizatriptan (MAXALT-MLT) 10 mg disintegrating tablet take 1 tablet by mouth AT START OF HEADACHE, REPEAT 1 TIME IN 2 HOURS IF PAIN PERSIST maximum daily dose of 2      simvastatin (ZOCOR) 40 mg tablet Take 40 mg by mouth nightly. No Known Allergies      REVIEW OF SYSTEMS    Constitutional: Negative for fever, chills, or weight change. Respiratory: Negative for cough or shortness of breath. Cardiovascular: Negative for chest pain or palpitations. Gastrointestinal: Negative for acid reflux, change in bowel habits, or constipation. Genitourinary: Negative for dysuria and flank pain. Musculoskeletal: Positive for lumbar and left thigh, popliteal, calf, and ankle pain. Skin: Negative for rash. Neurological: Negative for headaches, dizziness, or numbness. Endo/Heme/Allergies: Negative for increased bruising. Psychiatric/Behavioral: Negative for difficulty with sleep. As per HPI    PHYSICAL EXAMINATION  Visit Vitals  /77 (BP 1 Location: Right arm, BP Patient Position: Sitting, BP Cuff Size: Adult)   Pulse 82   Temp (!) 96 °F (35.6 °C) (Tympanic)   Resp 16   Ht 5' 7\" (1.702 m)   Wt 244 lb (110.7 kg)   SpO2 98%   BMI 38.22 kg/m²       Constitutional: Awake, alert, and in no acute distress. Neurological: Sensation to light touch is intact. Skin: warm, dry, and intact. Musculoskeletal: Mild tenderness to palpation over the midline lumbar region. Mild pain with extension, axial loading, and forward flexion. No pain with internal or external rotation of his hips. Positive straight leg raise on the left. Hip Flex  Quads Hamstrings Ankle DF EHL Ankle PF   Right +4/5 +4/5 +4/5 +4/5 +4/5 +4/5   Left +4/5 +4/5 +4/5 +4/5 +4/5 +4/5     IMAGING:    Lumbar spine MRI from 05/13/2022 was personally reviewed with the patient and his wife and demonstrated:    Trace retrolisthesis L4. No spondylolysis. No compression fracture. No  pathologic marrow signal. Hemangioma in L2. Conus medullaris ends at T12 with  normal morphology and signal intensity. Paraspinous soft tissues unremarkable.     Sagittal images show mild posterior disc bulge at T10-T11. No central stenosis. No foraminal stenosis. Suspect chronic Schmorl's node formation in the posterior  inferior endplate of Q49     Y8-C4, L2-L3, and L3-L4: No disc herniation, central or foraminal stenosis.     L4-L5: Slightly smaller left paracentral/posterior lateral disc  protrusion, with less inferior migration. Still, compression of the left lateral  recess, descending L5 nerve root. No overall central stenosis. With facet  hypertrophy , mild bilateral foraminal stenosis.     L5-S1: No disc herniation or central stenosis. Mild foraminal hypertrophy with  no foraminal stenosis.     IMPRESSION  1. Slightly smaller left posterolateral disc protrusion at L4-L5, still with  compression of the left descending L5 nerve root. 2. Stable study otherwise. Written by Sonido Pitts, as dictated by Richy Ortega MD.  I, Dr. Richy Ortega confirm that all documentation is accurate.

## 2022-05-31 ENCOUNTER — APPOINTMENT (OUTPATIENT)
Dept: GENERAL RADIOLOGY | Age: 52
End: 2022-05-31
Attending: PHYSICAL MEDICINE & REHABILITATION
Payer: COMMERCIAL

## 2022-05-31 ENCOUNTER — HOSPITAL ENCOUNTER (OUTPATIENT)
Age: 52
Setting detail: OUTPATIENT SURGERY
Discharge: HOME | End: 2022-05-31
Attending: PHYSICAL MEDICINE & REHABILITATION | Admitting: PHYSICAL MEDICINE & REHABILITATION
Payer: COMMERCIAL

## 2022-05-31 VITALS
HEART RATE: 76 BPM | RESPIRATION RATE: 16 BRPM | TEMPERATURE: 98.1 F | DIASTOLIC BLOOD PRESSURE: 84 MMHG | OXYGEN SATURATION: 96 % | SYSTOLIC BLOOD PRESSURE: 130 MMHG

## 2022-05-31 PROCEDURE — 64483 NJX AA&/STRD TFRM EPI L/S 1: CPT | Performed by: PHYSICAL MEDICINE & REHABILITATION

## 2022-05-31 PROCEDURE — 74011000636 HC RX REV CODE- 636: Performed by: PHYSICAL MEDICINE & REHABILITATION

## 2022-05-31 PROCEDURE — 64484 NJX AA&/STRD TFRM EPI L/S EA: CPT | Performed by: PHYSICAL MEDICINE & REHABILITATION

## 2022-05-31 PROCEDURE — 74011250637 HC RX REV CODE- 250/637: Performed by: PHYSICAL MEDICINE & REHABILITATION

## 2022-05-31 PROCEDURE — 76010000009 HC PAIN MGT 0 TO 30 MIN PROC: Performed by: PHYSICAL MEDICINE & REHABILITATION

## 2022-05-31 PROCEDURE — 77030039433 HC TY MYLEOGRAM BD -B: Performed by: PHYSICAL MEDICINE & REHABILITATION

## 2022-05-31 PROCEDURE — 74011000250 HC RX REV CODE- 250: Performed by: PHYSICAL MEDICINE & REHABILITATION

## 2022-05-31 PROCEDURE — 2709999900 HC NON-CHARGEABLE SUPPLY: Performed by: PHYSICAL MEDICINE & REHABILITATION

## 2022-05-31 PROCEDURE — 77030003669 HC NDL SPN COOK -B: Performed by: PHYSICAL MEDICINE & REHABILITATION

## 2022-05-31 PROCEDURE — 74011250636 HC RX REV CODE- 250/636: Performed by: PHYSICAL MEDICINE & REHABILITATION

## 2022-05-31 PROCEDURE — 77030003672 HC NDL SPN HALY -A: Performed by: PHYSICAL MEDICINE & REHABILITATION

## 2022-05-31 RX ORDER — DIAZEPAM 5 MG/1
5-20 TABLET ORAL ONCE
Status: COMPLETED | OUTPATIENT
Start: 2022-05-31 | End: 2022-05-31

## 2022-05-31 RX ORDER — DEXAMETHASONE SODIUM PHOSPHATE 100 MG/10ML
INJECTION INTRAMUSCULAR; INTRAVENOUS AS NEEDED
Status: DISCONTINUED | OUTPATIENT
Start: 2022-05-31 | End: 2022-05-31 | Stop reason: HOSPADM

## 2022-05-31 RX ORDER — LIDOCAINE HYDROCHLORIDE 10 MG/ML
INJECTION, SOLUTION EPIDURAL; INFILTRATION; INTRACAUDAL; PERINEURAL AS NEEDED
Status: DISCONTINUED | OUTPATIENT
Start: 2022-05-31 | End: 2022-05-31 | Stop reason: HOSPADM

## 2022-05-31 RX ADMIN — DIAZEPAM 10 MG: 5 TABLET ORAL at 10:09

## 2022-05-31 NOTE — INTERVAL H&P NOTE
Update History & Physical    The Patient's History and Physical of May 18,   2022 was reviewed. There was no change. The surgical site was confirmed by the patient and me. Plan:  The risk, benefits, expected outcome, and alternative to the recommended procedure have been discussed with the patient. Patient understands and wants to proceed with the procedure.     Electronically signed by Lianet Cardoso MD on 5/31/2022 at 10:34 AM

## 2022-05-31 NOTE — PROCEDURES
SELECTIVE NERVE ROOT BLOCK PROCEDURE NOTE      Patient Name: Windy Irvin  Date of Procedure: May 31, 2022  Preoperative Diagnosis:  Lumbar radiculopathy [M54.16]  Post Operative Diagnosis:  Lumbar radiculopathy [M54.16]  Location:  [address] [place] [address]    Procedure :    left L4 Selective Nerve Root Block  left L5 Selective Nerve Root Block    Consent:  Informed consent was obtained prior to the procedure. The patient was given the opportunity to ask questions regarding the procedure and its associated risks. In addition to the potential risks associated with the procedure itself, the patient was informed both verbally and in writing of the potential side effects of the use of glucocorticoid. The patient appeared to comprehend the informed consent and desired to have the procedure performed. Procedure: The patient was placed in the prone position on the fluoroscopy table and the back was prepped and draped in the usual sterile manner. The exact spinal level was  identified using fluoroscopy, and Lidocaine 1 % was injected locally, a 22 gauge spinal needle was passed to the transverse process. The depth was noted and the needle redirected to pass inferior and approximately one cm anterior to the transverse process. YES  1 cc of Isovue M-200 was used to verify positioning in the epidural and paravertebral space and outlined the course of the spinal nerve into the epidural space. The same procedure was repeated at each spinal level indicated above. No vascular uptake was identified. A total of 10 mg of preservative free Dexamethasone and 1 cc of Lidocaine/site was slowly injected. The patient tolerated the procedure well. The injection area was cleaned and bandaids applied. Not excessive bleeding was noted. Patient dressed and discharged to home with instructions. Discussion: The patient tolerated the procedure well.  Patient reported rahul-procedural pain on Visual Analog Scale:  pre-5; post-3.                                               Alex Mcmahan MD  May 31, 2022

## 2022-07-01 DIAGNOSIS — M47.816 LUMBAR FACET ARTHROPATHY: ICD-10-CM

## 2022-07-04 RX ORDER — GABAPENTIN 300 MG/1
CAPSULE ORAL
Qty: 360 CAPSULE | Refills: 1 | Status: SHIPPED | OUTPATIENT
Start: 2022-07-04 | End: 2022-07-14 | Stop reason: SDUPTHER

## 2022-07-04 RX ORDER — NAPROXEN 500 MG/1
500 TABLET ORAL
Qty: 180 TABLET | Refills: 0 | Status: SHIPPED | OUTPATIENT
Start: 2022-07-04

## 2022-07-14 ENCOUNTER — OFFICE VISIT (OUTPATIENT)
Dept: ORTHOPEDIC SURGERY | Age: 52
End: 2022-07-14
Payer: COMMERCIAL

## 2022-07-14 VITALS
OXYGEN SATURATION: 96 % | HEART RATE: 76 BPM | RESPIRATION RATE: 17 BRPM | WEIGHT: 248 LBS | TEMPERATURE: 98.5 F | BODY MASS INDEX: 38.84 KG/M2

## 2022-07-14 DIAGNOSIS — Z86.16 PERSONAL HISTORY OF COVID-19: ICD-10-CM

## 2022-07-14 DIAGNOSIS — M62.838 MUSCLE SPASM: ICD-10-CM

## 2022-07-14 DIAGNOSIS — M47.816 LUMBAR FACET ARTHROPATHY: ICD-10-CM

## 2022-07-14 DIAGNOSIS — M54.16 LEFT LUMBAR RADICULITIS: Primary | ICD-10-CM

## 2022-07-14 DIAGNOSIS — M51.26 HNP (HERNIATED NUCLEUS PULPOSUS), LUMBAR: ICD-10-CM

## 2022-07-14 PROCEDURE — 99213 OFFICE O/P EST LOW 20 MIN: CPT | Performed by: PHYSICAL MEDICINE & REHABILITATION

## 2022-07-14 RX ORDER — GABAPENTIN 300 MG/1
CAPSULE ORAL
Qty: 540 CAPSULE | Refills: 1 | Status: SHIPPED | OUTPATIENT
Start: 2022-07-14

## 2022-07-14 NOTE — PROGRESS NOTES
[facility]  [address]   Phone: [phone]  Fax: [phone]    Pt's YOB: 1970    ASSESSMENT   Diagnoses and all orders for this visit:    1. Left lumbar radiculitis  -     SCHEDULE SURGERY    2. HNP (herniated nucleus pulposus), lumbar  -     SCHEDULE SURGERY    3. Lumbar facet arthropathy  -     gabapentin (NEURONTIN) 300 mg capsule; Take 3 caps po BID  Indications: neuropathic pain    4. Muscle spasm    5. Personal history of COVID-19         IMPRESSION AND PLAN:  Alexia Rodrigues is a 54 y.o. right hand dominant male with history of left lower extremity pain and presents to the office today for injection follow up. Pt report receiving a left L4, L5 SNRB injection with Dr. Nuha Calle with relief the day of the injection and two weeks afterwards. He is still taking Neurontin but has increased his dosage to 3 tabs BID. 1) Pt was given information on herniated disc exercises. 2) He was instructed to increase his dosage of Neurontin to 300 mg 3 caps BID. A refill was ordered at this visit. 3) SNRB left L4, left L5 with Dr Nuha Calle ordered  4) Mr. Renay Kim has a reminder for a \"due or due soon\" health maintenance. I have asked that he contact his primary care provider, Linda Zaragoza NP, for follow-up on this health maintenance. 5)  demonstrated consistency with prescribing. 6)  Pt was counseled on proper lifting mechanics and to avoid prolonged sitting. Follow-up and Dispositions    · Return in about 6 weeks (around 8/25/2022) for injection follow up. HISTORY OF PRESENT ILLNESS:  Alexia Rodrigues is a 54 y.o. right hand dominant male with history of left lower extremity pain and presents to the office today for injection follow up. Pt report receiving a left L4, L5 SNRB injection with Dr. Nuha Calle with relief the day of the injection and two weeks afterwards.  Pt notes improving compared to previous visit but does still admit to pain radiating from left buttock towards lower extremity beginning a month after the injection. He mentions consistent bending and lifting within his job, which does exacerbate his pain. Pt further notes he has had consistent lower lumbar pain consistently for the past two years but recently it has begun to become intolerable. He is still taking Neurontin but has increased his dosage to 3 tabs BID. Pt at this time desires to proceed with left L4, L5 SNRB injection. Pain Scale: 2/10    PCP: Richy Love NP     Past Medical History:   Diagnosis Date    Chest pain, unspecified     Lower back pain     Other and unspecified hyperlipidemia         Social History     Socioeconomic History    Marital status:      Spouse name: Not on file    Number of children: Not on file    Years of education: Not on file    Highest education level: Not on file   Occupational History    Not on file   Tobacco Use    Smoking status: Never Smoker    Smokeless tobacco: Never Used   Substance and Sexual Activity    Alcohol use: Never     Comment: no significant hx     Drug use: Never    Sexual activity: Not on file   Other Topics Concern    Not on file   Social History Narrative    Not on file     Social Determinants of Health     Financial Resource Strain:     Difficulty of Paying Living Expenses: Not on file   Food Insecurity:     Worried About Running Out of Food in the Last Year: Not on file    [place] of Food in the Last Year: Not on file   Transportation Needs:     Lack of Transportation (Medical): Not on file    Lack of Transportation (Non-Medical):  Not on file   Physical Activity:     Days of Exercise per Week: Not on file    Minutes of Exercise per Session: Not on file   Stress:     Feeling of Stress : Not on file   Social Connections:     Frequency of Communication with Friends and Family: Not on file    Frequency of Social Gatherings with Friends and Family: Not on file    Attends Oriental orthodox Services: Not on file    Active Member of Clubs or Organizations: Not on file    Attends Club or Organization Meetings: Not on file    Marital Status: Not on file   Intimate Partner Violence:     Fear of Current or Ex-Partner: Not on file    Emotionally Abused: Not on file    Physically Abused: Not on file    Sexually Abused: Not on file   Housing Stability:     Unable to Pay for Housing in the Last Year: Not on file    Number of [place] in the Last Year: Not on file    Unstable Housing in the Last Year: Not on file       Current Outpatient Medications   Medication Sig Dispense Refill    gabapentin (NEURONTIN) 300 mg capsule Take 3 caps po BID  Indications: neuropathic pain 540 Capsule 1    naproxen (NAPROSYN) 500 mg tablet Take 1 Tablet by mouth two (2) times daily as needed for Pain. Indications: pain 180 Tablet 0    rizatriptan (MAXALT-MLT) 10 mg disintegrating tablet take 1 tablet by mouth AT START OF HEADACHE, REPEAT 1 TIME IN 2 HOURS IF PAIN PERSIST maximum daily dose of 2      simvastatin (ZOCOR) 40 mg tablet Take 40 mg by mouth nightly.  prazosin (MINIPRESS) 1 mg capsule  (Patient not taking: Reported on 7/14/2022)         No Known Allergies      REVIEW OF SYSTEMS    Constitutional: Negative for fever, chills, or weight change. Respiratory: Negative for cough or shortness of breath. Cardiovascular: Negative for chest pain or palpitations. Gastrointestinal: Negative for acid reflux, change in bowel habits, or constipation. Genitourinary: Negative for dysuria and flank pain. Musculoskeletal: Positive for lumbar and left leg pain. Skin: Negative for rash. Neurological: Negative for headaches, dizziness, or numbness. Endo/Heme/Allergies: Negative for increased bruising. Psychiatric/Behavioral: Negative for difficulty with sleep.     As per HPI    PHYSICAL EXAMINATION  Visit Vitals  Pulse 76   Temp 98.5 °F (36.9 °C)   Resp 17   Wt 248 lb (112.5 kg) SpO2 96%   BMI 38.84 kg/m²       Constitutional: Awake, alert, and in no acute distress. Neurological: Sensation to light touch is intact. Negative Vo's sign bilaterally. Skin: warm, dry, and intact. Musculoskeletal: Pain in left lumbar region with flexion. No pain with extension, axial loading, or forward flexion. No pain with internal or external rotation of his hips. Positive straight leg raise on the left. Biceps  Triceps Deltoids Wrist Ext Wrist Flex Hand Intrin   Right +4/5 +4/5 +4/5 +4/5 +4/5 +4/5   Left +4/5 +4/5 +4/5 +4/5 +4/5 +4/5      Hip Flex  Quads Hamstrings Ankle DF EHL Ankle PF   Right +4/5 +4/5 +4/5 +4/5 +4/5 +4/5   Left +4/5 +4/5 +4/5 +4/5 +4/5 +4/5     IMAGING:  Lumbar spine 4V x-rays from 04/28/2022 were reviewed and demonstrated:   Degenerative disc L5-S1. Multilevel degenerative facet. No instability. Mild listhesis at L4-5. Lumbar spine MRI from 05/13/2022 was personally reviewed with the patient and demonstrated:     Trace retrolisthesis L4. No spondylolysis. No compression fracture. No  pathologic marrow signal. Hemangioma in L2. Conus medullaris ends at T12 with  normal morphology and signal intensity. Paraspinous soft tissues unremarkable.     Sagittal images show mild posterior disc bulge at T10-T11. No central stenosis. No foraminal stenosis. Suspect chronic Schmorl's node formation in the posterior  inferior endplate of N26     D6-Q0, L2-L3, and L3-L4: No disc herniation, central or foraminal stenosis.     L4-L5: Slightly smaller left paracentral/posterior lateral disc  protrusion, with less inferior migration. Still, compression of the left lateral  recess, descending L5 nerve root. No overall central stenosis. With facet  hypertrophy , mild bilateral foraminal stenosis.     L5-S1: No disc herniation or central stenosis. Mild foraminal hypertrophy with  no foraminal stenosis.     IMPRESSION  1.  Slightly smaller left posterolateral disc protrusion at L4-L5, still with  compression of the left descending L5 nerve root. 2. Stable study otherwise. Written by Pam Robin, as dictated by Eva Luna MD.  I, Dr. Eva Luna confirm that all documentation is accurate.

## 2022-07-14 NOTE — LETTER
7/19/2022    Patient: Elina Bloom   YOB: 1970   Date of Visit: 7/14/2022     Caty Mack NP  [address]  Via Fax: [phone]    Dear Caty Mack NP,      Thank you for referring Mr. Lane Lobo to [address] for evaluation. My notes for this consultation are attached. If you have questions, please do not hesitate to call me. I look forward to following your patient along with you.       Sincerely,    Mallory Mckeon MD

## 2022-08-17 ENCOUNTER — HOSPITAL ENCOUNTER (OUTPATIENT)
Age: 52
Setting detail: OUTPATIENT SURGERY
Discharge: HOME | End: 2022-08-17
Attending: PHYSICAL MEDICINE & REHABILITATION | Admitting: PHYSICAL MEDICINE & REHABILITATION
Payer: COMMERCIAL

## 2022-08-17 ENCOUNTER — APPOINTMENT (OUTPATIENT)
Dept: GENERAL RADIOLOGY | Age: 52
End: 2022-08-17
Attending: PHYSICAL MEDICINE & REHABILITATION
Payer: COMMERCIAL

## 2022-08-17 VITALS
SYSTOLIC BLOOD PRESSURE: 110 MMHG | RESPIRATION RATE: 16 BRPM | OXYGEN SATURATION: 95 % | HEART RATE: 74 BPM | TEMPERATURE: 98.5 F | DIASTOLIC BLOOD PRESSURE: 84 MMHG

## 2022-08-17 DIAGNOSIS — M54.16 RADICULOPATHY, LUMBAR REGION: Primary | ICD-10-CM

## 2022-08-17 PROCEDURE — 76010000009 HC PAIN MGT 0 TO 30 MIN PROC: Performed by: PHYSICAL MEDICINE & REHABILITATION

## 2022-08-17 PROCEDURE — 74011250637 HC RX REV CODE- 250/637: Performed by: PHYSICAL MEDICINE & REHABILITATION

## 2022-08-17 PROCEDURE — 64484 NJX AA&/STRD TFRM EPI L/S EA: CPT | Performed by: PHYSICAL MEDICINE & REHABILITATION

## 2022-08-17 PROCEDURE — 74011000636 HC RX REV CODE- 636: Performed by: PHYSICAL MEDICINE & REHABILITATION

## 2022-08-17 PROCEDURE — 74011000250 HC RX REV CODE- 250: Performed by: PHYSICAL MEDICINE & REHABILITATION

## 2022-08-17 PROCEDURE — 77030003669 HC NDL SPN COOK -B: Performed by: PHYSICAL MEDICINE & REHABILITATION

## 2022-08-17 PROCEDURE — 2709999900 HC NON-CHARGEABLE SUPPLY: Performed by: PHYSICAL MEDICINE & REHABILITATION

## 2022-08-17 PROCEDURE — 64483 NJX AA&/STRD TFRM EPI L/S 1: CPT | Performed by: PHYSICAL MEDICINE & REHABILITATION

## 2022-08-17 PROCEDURE — 77030003676 HC NDL SPN MPRI -A: Performed by: PHYSICAL MEDICINE & REHABILITATION

## 2022-08-17 PROCEDURE — 77030039433 HC TY MYLEOGRAM BD -B: Performed by: PHYSICAL MEDICINE & REHABILITATION

## 2022-08-17 PROCEDURE — 74011250636 HC RX REV CODE- 250/636: Performed by: PHYSICAL MEDICINE & REHABILITATION

## 2022-08-17 RX ORDER — LIDOCAINE HYDROCHLORIDE 10 MG/ML
INJECTION, SOLUTION EPIDURAL; INFILTRATION; INTRACAUDAL; PERINEURAL AS NEEDED
Status: DISCONTINUED | OUTPATIENT
Start: 2022-08-17 | End: 2022-08-17 | Stop reason: HOSPADM

## 2022-08-17 RX ORDER — DIAZEPAM 5 MG/1
5-20 TABLET ORAL ONCE
Status: COMPLETED | OUTPATIENT
Start: 2022-08-17 | End: 2022-08-17

## 2022-08-17 RX ORDER — DEXAMETHASONE SODIUM PHOSPHATE 100 MG/10ML
INJECTION INTRAMUSCULAR; INTRAVENOUS AS NEEDED
Status: DISCONTINUED | OUTPATIENT
Start: 2022-08-17 | End: 2022-08-17 | Stop reason: HOSPADM

## 2022-08-17 RX ADMIN — DIAZEPAM 10 MG: 5 TABLET ORAL at 12:21

## 2022-08-17 NOTE — PROCEDURES
PROCEDURE NOTE      Patient Name: Skylar Bell    Date of Procedure: August 17, 2022    Preoperative Diagnosis:  Lumbar radiculopathy [M54.16]    Post Operative Diagnosis:  Lumbar radiculopathy [M54.16]    Procedure :    left L4 Selective Nerve Root Block  left L5 Selective Nerve Root Block      Consent:  Informed consent was obtained prior to the procedure. The patient was given the opportunity to ask questions regarding the procedure and its associated risks. In addition to the potential risks associated with the procedure itself, the patient was informed both verbally and in writing of the potential side effects of the use of glucocorticoid. The patient appeared to comprehend the informed consent and desired to have the procedure performed. Procedure: The patient was placed in the prone position on the fluoroscopy table and the back was prepped and draped in the usual sterile manner. The exact spinal level was  identified using fluoroscopy, and Lidocaine 1 % was injected locally, a # 22 gauge spinal needle was passed to the transverse process. The depth was noted and the needle redirected to pass inferior and approximately one cm anterior to the transverse process. YES  1 cc of Isovue M-200 was used to verify positioning in the epidural and paravertebral space and outlined the course of the spinal nerve into the epidural space. The same procedure was repeated at each spinal level indicated above. A total of 10 mg of preservative free Dexamethasone and 5 cc of Lidocaine was slowly injected. The patient tolerated the procedure well. The injection area was cleaned and bandaids applied. Not excessive bleeding was noted. Patient dressed and discharged to home with instructions. Discussion: The patient tolerated the procedure well.                                               Elvira Helm MD  August 17, 2022

## 2022-08-17 NOTE — H&P
Date of Surgery Update:  Erickson Torres was seen and examined. History and physical has been reviewed. The patient has been examined. There have been no significant clinical changes since the last office visit. The patient was counseled at length about the risks of anastasiya Covid-19 during their perioperative period and any recovery window from their procedure. The patient was made aware that anastasiya Covid-19  may worsen their prognosis for recovering from their procedure and lend to a higher morbidity and/or mortality risk. All material risks, benefits, and reasonable alternatives including postponing the procedure were discussed. The patient does  wish to proceed with the procedure at this time.     Pre Injection pain level:        3/10  Post Injection pain level:        3/10       Signed By: Rosaline Prasad MD     August 17, 2022 12:26 PM

## 2022-11-01 ENCOUNTER — OFFICE VISIT (OUTPATIENT)
Dept: ORTHOPEDIC SURGERY | Age: 52
End: 2022-11-01
Payer: COMMERCIAL

## 2022-11-01 VITALS
HEART RATE: 74 BPM | BODY MASS INDEX: 38.61 KG/M2 | TEMPERATURE: 97.6 F | HEIGHT: 67 IN | WEIGHT: 246 LBS | OXYGEN SATURATION: 98 %

## 2022-11-01 DIAGNOSIS — M62.838 MUSCLE SPASM: ICD-10-CM

## 2022-11-01 DIAGNOSIS — M51.26 HNP (HERNIATED NUCLEUS PULPOSUS), LUMBAR: ICD-10-CM

## 2022-11-01 DIAGNOSIS — R15.9 INCONTINENCE OF FECES, UNSPECIFIED FECAL INCONTINENCE TYPE: ICD-10-CM

## 2022-11-01 DIAGNOSIS — M54.16 LEFT LUMBAR RADICULITIS: ICD-10-CM

## 2022-11-01 DIAGNOSIS — M54.16 LEFT LUMBAR RADICULITIS: Primary | ICD-10-CM

## 2022-11-01 DIAGNOSIS — M47.816 LUMBAR FACET ARTHROPATHY: ICD-10-CM

## 2022-11-01 PROCEDURE — 99214 OFFICE O/P EST MOD 30 MIN: CPT | Performed by: PHYSICAL MEDICINE & REHABILITATION

## 2022-11-01 RX ORDER — METHYLPREDNISOLONE 4 MG/1
TABLET ORAL
COMMUNITY
Start: 2022-09-15

## 2022-11-01 RX ORDER — PREGABALIN 75 MG/1
CAPSULE ORAL
Qty: 540 CAPSULE | Refills: 0 | Status: SHIPPED | OUTPATIENT
Start: 2022-11-01

## 2022-11-01 NOTE — PROGRESS NOTES
[facility]  [address] [place] [address], [facility] [address]  Phone: [phone]  Fax: [phone]    Pt's YOB: 1970    ASSESSMENT   Diagnoses and all orders for this visit:    1. Left lumbar radiculitis  -     pregabalin (Lyrica) 75 mg capsule; 2 po tid - taper up as directed  -     MRI LUMB SPINE WO CONT; Future    2. HNP (herniated nucleus pulposus), lumbar  -     pregabalin (Lyrica) 75 mg capsule; 2 po tid - taper up as directed  -     MRI LUMB SPINE WO CONT; Future    3. Lumbar facet arthropathy  -     MRI LUMB SPINE WO CONT; Future    4. Incontinence of feces, unspecified fecal incontinence type  -     MRI LUMB SPINE WO CONT; Future    5. Muscle spasm  -     MRI LUMB SPINE WO CONT; Future       IMPRESSION AND PLAN:   Pt is 55 yo male with hx progressive lumbar pain and left radicular symptoms; he recently reported some bowel incontinence and is not considering surgical intervention; he has had 2 sets of lumbar injections and the second set did not give him any significant relief. 1) Pt was given information on herniated disc exercises. 2) Pt given prescription for Lyrica 75 mg -- he will taper off of gabapentin and begin tapering up on Lyrica to total daily dose of 150 mg tid  3) Will obtain new MRI to assess for worsening impingement as his pain has worsened and he is considering surgical intervention  4) Mr. Connell Danielson has a reminder for a \"due or due soon\" health maintenance. I have asked that he contact his primary care provider, Jennifer Tay NP, for follow-up on this health maintenance. 5)  demonstrated consistency with prescribing. Follow-up and Dispositions    Return in about 4 weeks (around 2022) for Diagnostic Test follow up, Medication follow up-- follow up on Thursday.              HISTORY OF PRESENT ILLNESS:  Leticia Abreu is a 54 y.o.  male with history of lumbar and leg pain and presents to the office today for injection follow up. Pt presents to the office for lumbar injection follow up in August (Left L4, L5 SNRB); he previously had the ejremiah injection in May which was more effective . He states he did not get any relief with the last set of injections and has been using gabapentin for his neuropathic symptoms. He currently rates his pain as 3/10 and describes it as aching, with left leg NT and burning. He is having persistent pain which is worse with sitting and has pain in his heel. He notes that he recently took a trip and sitting on the plane was quite painful. He also reports he has been having some intermittent bowel incontinence. He works as a  and tries to remain active. He agrees to new MRI as he is having progressive pain and is considering surgical intervention. Pt previously had seen Dr Gina Castillo and may follow up with her again. More than 30 minutes was spent with pt and his wife reviewing previous MRI, lumbar injections, medications and current treatment plan.     Pain Scale: 3/10    PCP: Governor Marianne NP     Past Medical History:   Diagnosis Date    Chest pain, unspecified     Lower back pain     Other and unspecified hyperlipidemia         Social History     Socioeconomic History    Marital status:      Spouse name: Not on file    Number of children: Not on file    Years of education: Not on file    Highest education level: Not on file   Occupational History    Not on file   Tobacco Use    Smoking status: Never    Smokeless tobacco: Never   Substance and Sexual Activity    Alcohol use: Never     Comment: no significant hx     Drug use: Never    Sexual activity: Not on file   Other Topics Concern    Not on file   Social History Narrative    Not on file     Social Determinants of Health     Financial Resource Strain: Not on file   Food Insecurity: Not on file   Transportation Needs: Not on file   Physical Activity: Not on file   Stress: Not on file   Social Connections: Not on file   Intimate Partner Violence: Not on file   Housing Stability: Not on file       Current Outpatient Medications   Medication Sig Dispense Refill    pregabalin (Lyrica) 75 mg capsule 2 po tid - taper up as directed 540 Capsule 0    gabapentin (NEURONTIN) 300 mg capsule Take 3 caps po BID  Indications: neuropathic pain 540 Capsule 1    naproxen (NAPROSYN) 500 mg tablet Take 1 Tablet by mouth two (2) times daily as needed for Pain. Indications: pain 180 Tablet 0    rizatriptan (MAXALT-MLT) 10 mg disintegrating tablet take 1 tablet by mouth AT START OF HEADACHE, REPEAT 1 TIME IN 2 HOURS IF PAIN PERSIST maximum daily dose of 2      simvastatin (ZOCOR) 40 mg tablet Take 40 mg by mouth nightly. methylPREDNISolone (MEDROL DOSEPACK) 4 mg tablet as directed TAPER DOSE PER INSTRUCTIONS INSIDE PACKAGE (Patient not taking: Reported on 11/1/2022)      prazosin (MINIPRESS) 1 mg capsule  (Patient not taking: No sig reported)         No Known Allergies      REVIEW OF SYSTEMS    Constitutional: Negative for fever, chills, or weight change. Respiratory: Negative for cough or shortness of breath. Cardiovascular: Negative for chest pain or palpitations. Gastrointestinal: Negative for acid reflux, change in bowel habits, or constipation. Genitourinary: Negative for dysuria and flank pain. Musculoskeletal: Positive for lumbar and leg pain. Skin: Negative for rash. Neurological: Negative for headaches, dizziness, +numbness. Endo/Heme/Allergies: Negative for increased bruising. Psychiatric/Behavioral: Negative for difficulty with sleep. As per HPI    PHYSICAL EXAMINATION  Visit Vitals  Pulse 74   Temp 97.6 °F (36.4 °C) (Temporal)   Ht 5' 7\" (1.702 m)   Wt 246 lb (111.6 kg)   SpO2 98%   BMI 38.53 kg/m²       Constitutional: Awake, alert, and in no acute distress. Neurological: 1+ symmetrical DTRs in the upper extremities. 1+ symmetrical DTRs in the lower extremities. Sensation to light touch is intact.  Negative Vo's sign bilaterally. Skin: warm, dry, and intact. Musculoskeletal:  Moderate pain with extension, axial loading, or forward flexion. No pain with internal or external rotation of his hips. Negative straight leg raise bilaterally. Biceps  Triceps Deltoids Wrist Ext Wrist Flex Hand Intrin   Right +4/5 +4/5 +4/5 +4/5 +4/5 +4/5   Left +4/5 +4/5 +4/5 +4/5 +4/5 +4/5      Hip Flex  Quads Hamstrings Ankle DF EHL Ankle PF   Right +4/5 +4/5 +4/5 +4/5 +4/5 +4/5   Left +4/5 +4/5 +4/5 +4/5 +4/5 +4/5     IMAGING:  Lumbar spine 4V x-rays from 04/28/2022 were reviewed and demonstrated:   Degenerative disc L5-S1. Multilevel degenerative facet. No instability. Mild listhesis at L4-5. Lumbar spine MRI from 05/13/2022 was personally reviewed with the patient and demonstrated:     Trace retrolisthesis L4. No spondylolysis. No compression fracture. No  pathologic marrow signal. Hemangioma in L2. Conus medullaris ends at T12 with  normal morphology and signal intensity. Paraspinous soft tissues unremarkable. Sagittal images show mild posterior disc bulge at T10-T11. No central stenosis. No foraminal stenosis. Suspect chronic Schmorl's node formation in the posterior  inferior endplate of G10     A9-C4, L2-L3, and L3-L4: No disc herniation, central or foraminal stenosis  L4-L5: Slightly smaller left paracentral/posterior lateral disc  protrusion, with less inferior migration. Still, compression of the left lateral  recess, descending L5 nerve root. No overall central stenosis. With facet  hypertrophy , mild bilateral foraminal stenosis. L5-S1: No disc herniation or central stenosis. Mild foraminal hypertrophy with  no foraminal stenosis. IMPRESSION  1. Slightly smaller left posterolateral disc protrusion at L4-L5, still with  compression of the left descending L5 nerve root. 2. Stable study otherwise.

## 2022-11-11 ENCOUNTER — HOSPITAL ENCOUNTER (OUTPATIENT)
Age: 52
Discharge: HOME | End: 2022-11-11
Attending: PHYSICAL MEDICINE & REHABILITATION
Payer: COMMERCIAL

## 2022-11-11 PROCEDURE — 72148 MRI LUMBAR SPINE W/O DYE: CPT

## 2022-11-21 NOTE — H&P (VIEW-ONLY)
[facility]  [address] [place], [address]  Phone: [phone]  Fax: [phone]    Pt's YOB: 1970    ASSESSMENT   Diagnoses and all orders for this visit:    1. Left lumbar radiculitis  -     SCHEDULE SURGERY  -     REFERRAL TO SPINE SURGERY  -     pregabalin (Lyrica) 150 mg capsule; Take 1 cap by mouth in the morning and 2 at night as directed. 2. Lumbar facet arthropathy  -     REFERRAL TO SPINE SURGERY    3. Muscle spasm  -     REFERRAL TO SPINE SURGERY       IMPRESSION AND PLAN:  Jacob Duran is a 54 y.o. right hand dominant male with history of lumbar and leg pain and presents to the office today for MRI follow up. Pt complains of continued pain in the lumbar region radiating down the left lower extremity to the ankle, progressive since his last office visit. He is taking Lyrica 75 mg 1 cap QAM and 2 caps QHS as directed with benefit. 1) Pt was given information on herniated disc exercises. 2) Lumbar spine MRI from 11/11/2022 was reviewed with the patient at today's office visit. 3) He will increase his Lyrica from 75 mg to 150 mg 1 cap QAM and 2 caps QHS as directed. 4) A left L4 and left L5 SNRB was ordered and scheduled with Dr Teresa Lopze at today's office visit. 5) Pt requested referral to Dr. Catrina Saab for surgical evaluation-- \"does not want to have surgery at [facility] needed. 6) Mr. Guzman Estrada has a reminder for a \"due or due soon\" health maintenance. I have asked that he contact his primary care provider, Osmar Martinez NP, for follow-up on this health maintenance. 7)  demonstrated consistency with prescribing. Follow-up and Dispositions    Return in about 4 months (around 3/22/2023) for Medication follow up on Thursday.              HISTORY OF PRESENT ILLNESS:  Jacob Duran is a 50 y.o. right hand dominant male with history of lumbar and leg pain and presents to the office today for MRI follow up. Pt complains of continued pain in the lumbar region radiating down the left lower extremity to the arch of the left foot, progressive since his last office visit. He reports progressive burning sensation in the legs (L>R) that is intermittent and exacerbated by sitting. Pt admits to being unable to bend while at work due to the pain but notes his pain has been lessened within the past two days with his medication regimen. He is taking Lyrica 75 mg 1 cap QAM and 2 caps QHS as directed with benefit. Pt at this time desires to proceed with a left L4 and left L5 SNRB and surgical evaluation with Dr. Meka Woody. Pain Scale: 2/10    PCP: Navin Tran NP     Past Medical History:   Diagnosis Date    Chest pain, unspecified     Lower back pain     Other and unspecified hyperlipidemia         Social History     Socioeconomic History    Marital status:      Spouse name: Not on file    Number of children: Not on file    Years of education: Not on file    Highest education level: Not on file   Occupational History    Not on file   Tobacco Use    Smoking status: Never    Smokeless tobacco: Never   Substance and Sexual Activity    Alcohol use: Never     Comment: no significant hx     Drug use: Never    Sexual activity: Not on file   Other Topics Concern    Not on file   Social History Narrative    Not on file     Social Determinants of Health     Financial Resource Strain: Not on file   Food Insecurity: Not on file   Transportation Needs: Not on file   Physical Activity: Not on file   Stress: Not on file   Social Connections: Not on file   Intimate Partner Violence: Not on file   Housing Stability: Not on file       Current Outpatient Medications   Medication Sig Dispense Refill    pregabalin (Lyrica) 150 mg capsule Take 1 cap by mouth in the morning and 2 at night as directed.  270 Capsule 1    pregabalin (Lyrica) 75 mg capsule 2 po tid - taper up as directed 540 Capsule 0    gabapentin (NEURONTIN) 300 mg capsule Take 3 caps po BID  Indications: neuropathic pain 540 Capsule 1    naproxen (NAPROSYN) 500 mg tablet Take 1 Tablet by mouth two (2) times daily as needed for Pain. Indications: pain 180 Tablet 0    rizatriptan (MAXALT-MLT) 10 mg disintegrating tablet take 1 tablet by mouth AT START OF HEADACHE, REPEAT 1 TIME IN 2 HOURS IF PAIN PERSIST maximum daily dose of 2      simvastatin (ZOCOR) 40 mg tablet Take 40 mg by mouth nightly. methylPREDNISolone (MEDROL DOSEPACK) 4 mg tablet as directed TAPER DOSE PER INSTRUCTIONS INSIDE PACKAGE (Patient not taking: No sig reported)      prazosin (MINIPRESS) 1 mg capsule          No Known Allergies      REVIEW OF SYSTEMS    Constitutional: Negative for fever, chills, or weight change. Respiratory: Negative for cough or shortness of breath. Cardiovascular: Negative for chest pain or palpitations. Gastrointestinal: Negative for acid reflux, change in bowel habits, or constipation. Genitourinary: Negative for dysuria and flank pain. Musculoskeletal: Positive for lumbar and left leg pain. Skin: Negative for rash. Neurological: Negative for headaches or dizziness. Positive for left leg numbness ad tingling. Endo/Heme/Allergies: Negative for increased bruising. Psychiatric/Behavioral: Negative for difficulty with sleep. As per HPI    PHYSICAL EXAMINATION  Visit Vitals  Resp 16   Ht 5' 7\" (1.702 m)   Wt 241 lb (109.3 kg)   BMI 37.75 kg/m²       Constitutional: Awake, alert, and in no acute distress. Neurological: 1+ symmetrical DTRs in the upper extremities. 1+ symmetrical DTRs in the lower extremities. Sensation to light touch is intact. Negative Vo's sign bilaterally. Skin: warm, dry, and intact. Musculoskeletal: Moderate pain with extension, axial loading, and forward flexion. No pain with internal or external rotation of his hips. Positive straight leg raise on the left.       Biceps  Triceps Deltoids Wrist Ext Wrist Flex Hand Intrin   Right +4/5 +4/5 +4/5 +4/5 +4/5 +4/5   Left +4/5 +4/5 +4/5 +4/5 +4/5 +4/5      Hip Flex  Quads Hamstrings Ankle DF EHL Ankle PF   Right +4/5 +4/5 +4/5 +4/5 +4/5 +4/5   Left +4/5 +4/5 +4/5 +4/5 +4/5 +4/5     IMAGING:    Lumbar spine MRI from 11/11/2022 was personally reviewed with the patient and demonstrated:    FINDINGS:     General: Vertebral body and disc space heights preserved. Multiple mild endplate  spurring. No significant listhesis. Lumbar lordosis maintained. Conus ends at  level T12. No abnormal signal in the distal cord. No abnormal epidural  collection identified. No suspicious marrow signal.     Miscellaneous: No significant incidental findings identified outside of the  lumbar spine region. Levels:     T11-T12: Sagittal plane only. No significant degenerative change or stenosis. T12-L1: No significant degenerative change or stenosis. L1-L2: Very minimal disc bulge. Mild facet evident. No significant stenosis. L2-L3: Minimal disc bulge. Mild facet arthropathy. Spinal canal patent. Minimal  foraminal narrowing. L3-L4: Very minimal disc bulge. Mild facet arthropathy. Spinal canal patent. Minimal foraminal narrowing. L4-L5: Mild eccentric to the left disc bulge. Mild facet arthropathy. Spinal  canal patent with minimal abutment of the crossing L5 nerves. Mild to moderate  foraminal stenoses. L5-S1: No significant disc herniation. Mild facet arthropathy. Spinal canal and  foramina patent. Imaged sacrum: Unremarkable. IMPRESSION     Multilevel overall mild disc herniations, most notable at L4-L5. Multilevel mild  facet arthropathy.  -Spinal canal largely patent. -L4-L5 mild to moderate foraminal stenoses. No more mild foraminal stenosis  elsewhere. Lumbar spine 4V x-rays from 04/28/2022 were reviewed and demonstrated:   Degenerative disc L5-S1. Multilevel degenerative facet. No instability. Mild listhesis at L4-5.        Written by Sae Chowdhury, as dictated by Loretta Ramirez MD.  I, Dr. Loretta Ramirez confirm that all documentation is accurate.

## 2022-11-21 NOTE — PROGRESS NOTES
[facility]  [address] [place], [facility] [address]  Phone: [phone]  Fax: [phone]    Pt's YOB: 1970    ASSESSMENT   Diagnoses and all orders for this visit:    1. Left lumbar radiculitis  -     SCHEDULE SURGERY  -     REFERRAL TO SPINE SURGERY  -     pregabalin (Lyrica) 150 mg capsule; Take 1 cap by mouth in the morning and 2 at night as directed. 2. Lumbar facet arthropathy  -     REFERRAL TO SPINE SURGERY    3. Muscle spasm  -     REFERRAL TO SPINE SURGERY       IMPRESSION AND PLAN:  Jaquelin Alejo is a 54 y.o. right hand dominant male with history of lumbar and leg pain and presents to the office today for MRI follow up. Pt complains of continued pain in the lumbar region radiating down the left lower extremity to the ankle, progressive since his last office visit. He is taking Lyrica 75 mg 1 cap QAM and 2 caps QHS as directed with benefit. 1) Pt was given information on herniated disc exercises. 2) Lumbar spine MRI from 11/11/2022 was reviewed with the patient at today's office visit. 3) He will increase his Lyrica from 75 mg to 150 mg 1 cap QAM and 2 caps QHS as directed. 4) A left L4 and left L5 SNRB was ordered and scheduled with Dr Carmen Cabral at today's office visit. 5) Pt requested referral to Dr. Rufus Tse for surgical evaluation-- \"does not want to have surgery at [facility] needed. 6) Mr. Shell Kerr has a reminder for a \"due or due soon\" health maintenance. I have asked that he contact his primary care provider, Adelfo Jacinto NP, for follow-up on this health maintenance. 7)  demonstrated consistency with prescribing. Follow-up and Dispositions    Return in about 4 months (around 3/22/2023) for Medication follow up on Thursday.              HISTORY OF PRESENT ILLNESS:  Jaquelin Alejo is a 50 y.o. right hand dominant male with history of lumbar and leg pain and presents to the office today for MRI follow up. Pt complains of continued pain in the lumbar region radiating down the left lower extremity to the arch of the left foot, progressive since his last office visit. He reports progressive burning sensation in the legs (L>R) that is intermittent and exacerbated by sitting. Pt admits to being unable to bend while at work due to the pain but notes his pain has been lessened within the past two days with his medication regimen. He is taking Lyrica 75 mg 1 cap QAM and 2 caps QHS as directed with benefit. Pt at this time desires to proceed with a left L4 and left L5 SNRB and surgical evaluation with Dr. Alda Mackay. Pain Scale: 2/10    PCP: Kerri Allen NP     Past Medical History:   Diagnosis Date    Chest pain, unspecified     Lower back pain     Other and unspecified hyperlipidemia         Social History     Socioeconomic History    Marital status:      Spouse name: Not on file    Number of children: Not on file    Years of education: Not on file    Highest education level: Not on file   Occupational History    Not on file   Tobacco Use    Smoking status: Never    Smokeless tobacco: Never   Substance and Sexual Activity    Alcohol use: Never     Comment: no significant hx     Drug use: Never    Sexual activity: Not on file   Other Topics Concern    Not on file   Social History Narrative    Not on file     Social Determinants of Health     Financial Resource Strain: Not on file   Food Insecurity: Not on file   Transportation Needs: Not on file   Physical Activity: Not on file   Stress: Not on file   Social Connections: Not on file   Intimate Partner Violence: Not on file   Housing Stability: Not on file       Current Outpatient Medications   Medication Sig Dispense Refill    pregabalin (Lyrica) 150 mg capsule Take 1 cap by mouth in the morning and 2 at night as directed.  270 Capsule 1    pregabalin (Lyrica) 75 mg capsule 2 po tid - taper up as directed 540 Capsule 0    gabapentin (NEURONTIN) 300 mg capsule Take 3 caps po BID  Indications: neuropathic pain 540 Capsule 1    naproxen (NAPROSYN) 500 mg tablet Take 1 Tablet by mouth two (2) times daily as needed for Pain. Indications: pain 180 Tablet 0    rizatriptan (MAXALT-MLT) 10 mg disintegrating tablet take 1 tablet by mouth AT START OF HEADACHE, REPEAT 1 TIME IN 2 HOURS IF PAIN PERSIST maximum daily dose of 2      simvastatin (ZOCOR) 40 mg tablet Take 40 mg by mouth nightly. methylPREDNISolone (MEDROL DOSEPACK) 4 mg tablet as directed TAPER DOSE PER INSTRUCTIONS INSIDE PACKAGE (Patient not taking: No sig reported)      prazosin (MINIPRESS) 1 mg capsule          No Known Allergies      REVIEW OF SYSTEMS    Constitutional: Negative for fever, chills, or weight change. Respiratory: Negative for cough or shortness of breath. Cardiovascular: Negative for chest pain or palpitations. Gastrointestinal: Negative for acid reflux, change in bowel habits, or constipation. Genitourinary: Negative for dysuria and flank pain. Musculoskeletal: Positive for lumbar and left leg pain. Skin: Negative for rash. Neurological: Negative for headaches or dizziness. Positive for left leg numbness ad tingling. Endo/Heme/Allergies: Negative for increased bruising. Psychiatric/Behavioral: Negative for difficulty with sleep. As per HPI    PHYSICAL EXAMINATION  Visit Vitals  Resp 16   Ht 5' 7\" (1.702 m)   Wt 241 lb (109.3 kg)   BMI 37.75 kg/m²       Constitutional: Awake, alert, and in no acute distress. Neurological: 1+ symmetrical DTRs in the upper extremities. 1+ symmetrical DTRs in the lower extremities. Sensation to light touch is intact. Negative Vo's sign bilaterally. Skin: warm, dry, and intact. Musculoskeletal: Moderate pain with extension, axial loading, and forward flexion. No pain with internal or external rotation of his hips. Positive straight leg raise on the left.       Biceps  Triceps Deltoids Wrist Ext Wrist Flex Hand Intrin   Right +4/5 +4/5 +4/5 +4/5 +4/5 +4/5   Left +4/5 +4/5 +4/5 +4/5 +4/5 +4/5      Hip Flex  Quads Hamstrings Ankle DF EHL Ankle PF   Right +4/5 +4/5 +4/5 +4/5 +4/5 +4/5   Left +4/5 +4/5 +4/5 +4/5 +4/5 +4/5     IMAGING:    Lumbar spine MRI from 11/11/2022 was personally reviewed with the patient and demonstrated:    FINDINGS:     General: Vertebral body and disc space heights preserved. Multiple mild endplate  spurring. No significant listhesis. Lumbar lordosis maintained. Conus ends at  level T12. No abnormal signal in the distal cord. No abnormal epidural  collection identified. No suspicious marrow signal.     Miscellaneous: No significant incidental findings identified outside of the  lumbar spine region. Levels:     T11-T12: Sagittal plane only. No significant degenerative change or stenosis. T12-L1: No significant degenerative change or stenosis. L1-L2: Very minimal disc bulge. Mild facet evident. No significant stenosis. L2-L3: Minimal disc bulge. Mild facet arthropathy. Spinal canal patent. Minimal  foraminal narrowing. L3-L4: Very minimal disc bulge. Mild facet arthropathy. Spinal canal patent. Minimal foraminal narrowing. L4-L5: Mild eccentric to the left disc bulge. Mild facet arthropathy. Spinal  canal patent with minimal abutment of the crossing L5 nerves. Mild to moderate  foraminal stenoses. L5-S1: No significant disc herniation. Mild facet arthropathy. Spinal canal and  foramina patent. Imaged sacrum: Unremarkable. IMPRESSION     Multilevel overall mild disc herniations, most notable at L4-L5. Multilevel mild  facet arthropathy.  -Spinal canal largely patent. -L4-L5 mild to moderate foraminal stenoses. No more mild foraminal stenosis  elsewhere. Lumbar spine 4V x-rays from 04/28/2022 were reviewed and demonstrated:   Degenerative disc L5-S1. Multilevel degenerative facet. No instability. Mild listhesis at L4-5.        Written by Janes Lambert, as dictated by Dimitri Orozco MD.  I, Dr. Dimitri Orozco confirm that all documentation is accurate.

## 2022-11-22 ENCOUNTER — OFFICE VISIT (OUTPATIENT)
Dept: ORTHOPEDIC SURGERY | Age: 52
End: 2022-11-22
Payer: COMMERCIAL

## 2022-11-22 VITALS — BODY MASS INDEX: 37.83 KG/M2 | RESPIRATION RATE: 16 BRPM | WEIGHT: 241 LBS | HEIGHT: 67 IN

## 2022-11-22 DIAGNOSIS — M62.838 MUSCLE SPASM: ICD-10-CM

## 2022-11-22 DIAGNOSIS — M47.816 LUMBAR FACET ARTHROPATHY: ICD-10-CM

## 2022-11-22 DIAGNOSIS — M54.16 LEFT LUMBAR RADICULITIS: Primary | ICD-10-CM

## 2022-11-22 PROCEDURE — 99214 OFFICE O/P EST MOD 30 MIN: CPT | Performed by: PHYSICAL MEDICINE & REHABILITATION

## 2022-11-22 RX ORDER — PREGABALIN 150 MG/1
CAPSULE ORAL
Qty: 270 CAPSULE | Refills: 1 | Status: SHIPPED | OUTPATIENT
Start: 2022-11-22

## 2022-11-22 NOTE — PROGRESS NOTES
Chief Complaint   Patient presents with    Follow-up       Pt preferred language for health care discussion is english. Is someone accompanying this pt? no    Is the patient using any DME equipment during [address]? no    Depression Screening:  3 most recent PHQ Screens 5/12/2020   Little interest or pleasure in doing things Not at all   Feeling down, depressed, irritable, or hopeless Not at all   Total Score PHQ 2 0       Learning Assessment:  No flowsheet data found. Health Maintenance reviewed and discussed per provider. Yes        Advance Directive:  1. Do you have an advance directive in place? Patient Reply:no    2. If not, would you like material regarding how to put one in place? Patient Reply: no    Coordination of Care:  1. Have you been to the ER, urgent care clinic since your last visit? Hospitalized since your last visit? no    2. Have you seen or consulted any other health care providers outside of the [address] [place] [address] since your last visit? Include any pap smears or colon screening.  no

## 2022-11-22 NOTE — LETTER
11/27/2022    Patient: Karey Goldberg   YOB: 1970   Date of Visit: 11/22/2022     Tu Patterson NP  [address] [facility]  [address] [facility]  Via Fax: [phone]    Dear Tu Patterson NP,      Thank you for referring Mr. Litzy El to [address] [place] [address] for evaluation. My notes for this consultation are attached. If you have questions, please do not hesitate to call me. I look forward to following your patient along with you.       Sincerely,    Janeth Mojica MD

## 2022-12-13 ENCOUNTER — APPOINTMENT (OUTPATIENT)
Dept: GENERAL RADIOLOGY | Age: 52
End: 2022-12-13
Attending: PHYSICAL MEDICINE & REHABILITATION
Payer: COMMERCIAL

## 2022-12-13 ENCOUNTER — HOSPITAL ENCOUNTER (OUTPATIENT)
Age: 52
Setting detail: OUTPATIENT SURGERY
Discharge: HOME | End: 2022-12-13
Attending: PHYSICAL MEDICINE & REHABILITATION | Admitting: PHYSICAL MEDICINE & REHABILITATION
Payer: COMMERCIAL

## 2022-12-13 VITALS
TEMPERATURE: 97.9 F | DIASTOLIC BLOOD PRESSURE: 78 MMHG | RESPIRATION RATE: 16 BRPM | SYSTOLIC BLOOD PRESSURE: 121 MMHG | OXYGEN SATURATION: 95 % | HEART RATE: 70 BPM

## 2022-12-13 PROCEDURE — 77030003669 HC NDL SPN COOK -B: Performed by: PHYSICAL MEDICINE & REHABILITATION

## 2022-12-13 PROCEDURE — 74011250636 HC RX REV CODE- 250/636: Performed by: PHYSICAL MEDICINE & REHABILITATION

## 2022-12-13 PROCEDURE — 74011250637 HC RX REV CODE- 250/637: Performed by: PHYSICAL MEDICINE & REHABILITATION

## 2022-12-13 PROCEDURE — 2709999900 HC NON-CHARGEABLE SUPPLY: Performed by: PHYSICAL MEDICINE & REHABILITATION

## 2022-12-13 PROCEDURE — 76010000009 HC PAIN MGT 0 TO 30 MIN PROC: Performed by: PHYSICAL MEDICINE & REHABILITATION

## 2022-12-13 PROCEDURE — 77030039433 HC TY MYLEOGRAM BD -B: Performed by: PHYSICAL MEDICINE & REHABILITATION

## 2022-12-13 PROCEDURE — 74011000636 HC RX REV CODE- 636: Performed by: PHYSICAL MEDICINE & REHABILITATION

## 2022-12-13 PROCEDURE — 74011000250 HC RX REV CODE- 250: Performed by: PHYSICAL MEDICINE & REHABILITATION

## 2022-12-13 PROCEDURE — 77030003672 HC NDL SPN HALY -A: Performed by: PHYSICAL MEDICINE & REHABILITATION

## 2022-12-13 RX ORDER — LIDOCAINE HYDROCHLORIDE 10 MG/ML
INJECTION, SOLUTION EPIDURAL; INFILTRATION; INTRACAUDAL; PERINEURAL AS NEEDED
Status: DISCONTINUED | OUTPATIENT
Start: 2022-12-13 | End: 2022-12-13 | Stop reason: HOSPADM

## 2022-12-13 RX ORDER — DEXAMETHASONE SODIUM PHOSPHATE 100 MG/10ML
INJECTION INTRAMUSCULAR; INTRAVENOUS AS NEEDED
Status: DISCONTINUED | OUTPATIENT
Start: 2022-12-13 | End: 2022-12-13 | Stop reason: HOSPADM

## 2022-12-13 RX ORDER — DIAZEPAM 5 MG/1
5-20 TABLET ORAL ONCE
Status: COMPLETED | OUTPATIENT
Start: 2022-12-13 | End: 2022-12-13

## 2022-12-13 RX ADMIN — DIAZEPAM 10 MG: 5 TABLET ORAL at 11:22

## 2022-12-13 NOTE — PERIOP NOTES
Patient verbalized understanding of discharge instructions and verbally consented to [facility] Patient Consent. Signature pad not working.

## 2022-12-13 NOTE — INTERVAL H&P NOTE
Update History & Physical    The Patient's History and Physical of November 22, 2022 was reviewed. There was no change. The surgical site was confirmed by the patient and me. Plan:  The risk, benefits, expected outcome, and alternative to the recommended procedure have been discussed with the patient. Patient understands and wants to proceed with the procedure.     Electronically signed by Osman Dickens MD on 12/13/2022 at 11:30 AM

## 2022-12-13 NOTE — PROCEDURES
SELECTIVE NERVE ROOT BLOCK PROCEDURE NOTE      Patient Name: Maddy Lorenz  Date of Procedure: December 13, 2022  Preoperative Diagnosis:  Lumbar Radicular Pain  Post Operative Diagnosis:  Lumbar Radicular Pain  Location:  [address] [place] [address]    Procedure :    left L4 Selective Nerve Root Block  left L5 Selective Nerve Root Block    Consent:  Informed consent was obtained prior to the procedure. The patient was given the opportunity to ask questions regarding the procedure and its associated risks. In addition to the potential risks associated with the procedure itself, the patient was informed both verbally and in writing of the potential side effects of the use of glucocorticoid. The patient appeared to comprehend the informed consent and desired to have the procedure performed. Procedure: The patient was placed in the prone position on the fluoroscopy table and the back was prepped and draped in the usual sterile manner. The exact spinal level was  identified using fluoroscopy, and Lidocaine 1 % was injected locally, a 22 gauge spinal needle was passed to the transverse process. The depth was noted and the needle redirected to pass inferior and approximately one cm anterior to the transverse process. YES  1 cc of Isovue M-200 was used to verify positioning in the epidural and paravertebral space and outlined the course of the spinal nerve into the epidural space. The same procedure was repeated at each spinal level indicated above. No vascular uptake was identified. A total of 10 mg of preservative free Dexamethasone and 1 cc of Lidocaine/site was slowly injected. The patient tolerated the procedure well. The injection area was cleaned and bandaids applied. Not excessive bleeding was noted. Patient dressed and discharged to home with instructions. Discussion: The patient tolerated the procedure well.  Patient reported rahul-procedural pain on Visual Analog Scale:  pre-6; post-0.                                               Richard Torres MD  December 13, 2022

## 2023-02-07 DIAGNOSIS — M47.816 LUMBAR FACET ARTHROPATHY: ICD-10-CM

## 2023-02-07 RX ORDER — NAPROXEN 500 MG/1
TABLET ORAL
Qty: 180 TABLET | Refills: 0 | Status: SHIPPED | OUTPATIENT
Start: 2023-02-07

## 2023-02-16 ENCOUNTER — OFFICE VISIT (OUTPATIENT)
Age: 53
End: 2023-02-16
Payer: COMMERCIAL

## 2023-02-16 VITALS
HEART RATE: 99 BPM | OXYGEN SATURATION: 96 % | BODY MASS INDEX: 36.1 KG/M2 | DIASTOLIC BLOOD PRESSURE: 85 MMHG | HEIGHT: 67 IN | WEIGHT: 230 LBS | SYSTOLIC BLOOD PRESSURE: 127 MMHG | TEMPERATURE: 97.5 F | RESPIRATION RATE: 18 BRPM

## 2023-02-16 DIAGNOSIS — M54.16 RADICULOPATHY, LUMBAR REGION: Primary | ICD-10-CM

## 2023-02-16 DIAGNOSIS — M51.26 OTHER INTERVERTEBRAL DISC DISPLACEMENT, LUMBAR REGION: ICD-10-CM

## 2023-02-16 PROCEDURE — 99214 OFFICE O/P EST MOD 30 MIN: CPT | Performed by: PHYSICAL MEDICINE & REHABILITATION

## 2023-02-16 RX ORDER — PREGABALIN 150 MG/1
CAPSULE ORAL
COMMUNITY
Start: 2022-11-22

## 2023-02-16 RX ORDER — NAPROXEN 500 MG/1
TABLET ORAL
COMMUNITY
Start: 2021-03-16 | End: 2023-02-16 | Stop reason: SDUPTHER

## 2023-02-16 RX ORDER — SUMATRIPTAN 20 MG/1
SPRAY NASAL
COMMUNITY
Start: 2023-01-24

## 2023-02-16 RX ORDER — FLUTICASONE PROPIONATE 50 MCG
SPRAY, SUSPENSION (ML) NASAL
COMMUNITY
Start: 2021-03-18 | End: 2023-02-16

## 2023-02-16 RX ORDER — RIZATRIPTAN BENZOATE 10 MG/1
TABLET, ORALLY DISINTEGRATING ORAL
COMMUNITY
Start: 2019-12-18

## 2023-02-16 RX ORDER — PANTOPRAZOLE SODIUM 40 MG/1
TABLET, DELAYED RELEASE ORAL
COMMUNITY
Start: 2023-01-19 | End: 2023-02-16

## 2023-02-16 RX ORDER — LORAZEPAM 0.5 MG/1
TABLET ORAL
COMMUNITY
Start: 2023-01-24 | End: 2023-02-16

## 2023-02-16 RX ORDER — ONDANSETRON HYDROCHLORIDE 8 MG/1
TABLET, FILM COATED ORAL
COMMUNITY
Start: 2017-10-12 | End: 2023-02-16

## 2023-02-16 RX ORDER — TADALAFIL 20 MG/1
TABLET ORAL
COMMUNITY
Start: 2022-01-13

## 2023-02-16 RX ORDER — SIMVASTATIN 40 MG
TABLET ORAL
COMMUNITY
Start: 2020-01-28

## 2023-02-16 RX ORDER — MONTELUKAST SODIUM 4 MG/1
TABLET, CHEWABLE ORAL
COMMUNITY
Start: 2023-01-20

## 2023-02-16 RX ORDER — NAPROXEN 500 MG/1
TABLET ORAL
Qty: 180 TABLET | Refills: 0 | Status: SHIPPED | OUTPATIENT
Start: 2023-02-16

## 2023-02-16 NOTE — PROGRESS NOTES
[address] [facility], [address]  [place] [address]  Phone: [phone]  Fax: [phone]        Serafin Ivey  : 1970  PCP: VINAY White - JAYME    PROGRESS NOTE      ASSESSMENT AND PLAN    Serafin was seen today for back pain.    Diagnoses and all orders for this visit:    Radiculopathy, lumbar region  -     naproxen (NAPROSYN) 500 MG tablet; TAKE 1 TABLET BY MOUTH  TWICE DAILY AS NEEDED FOR  PAIN    Other intervertebral disc displacement, lumbar region  -     naproxen (NAPROSYN) 500 MG tablet; TAKE 1 TABLET BY MOUTH  TWICE DAILY AS NEEDED FOR  PAIN      Serafin Ivey is a 52 y.o. male self-employed  with waxing/waning chronic left L5 radicular symptoms 3 years; no longer responding to spine injections.  Lyrica and naproxen take the edge off of his pain.  He has had 3 lumbar MRIs during this timeframe.  These have demonstrated some regression of his left L4-L5 lateral HNP.  RF as needed Naproxen.  Take with food  Continue routine Lyrica 150/300.  Discussed weaning back to 75/150  as he is uncertain if the increased dose helped him.  Currently he has a good supply of the 150 mg and will continue to take this.  He definitely notices when he misses a dose.  Patient will follow up with Dr. Kaiser next week.   Given instructions on piriformis stretches. Perform as tolerated.  He may be a candidate for a spinal cord stimulator.    Follow-up and Dispositions    Return if symptoms worsen or fail to improve.           HISTORY OF PRESENT ILLNESS      Serafin Ivey is a 52 y.o. male presents for follow up of back pain. To [place] with Dr. Samaniego, increased Lyrica to 150/300 mg, scheduled for left L4, L5 SNRB, referred to Dr. Kaiser for surgical consult.    Pt underwent left L4, L5 SNRB 2022 with no benefit. Denies side effects.    Pt reports low back pain radiating in his left buttock, posterior thigh, and calf to the foot. Leg > back pain. His pain is exacerbated with walking and  prolonged sitting. Pt cannot tolerate long car rides. He has difficulty sleeping due to back pain. He takes Lyrica 150/300 mg with some benefit. Pt notices an increase in pain when he does not take it. Denies side effects. He also uses Naproxen as needed, which helps. Patient initially had benefit with gabapentin which became ineffective. He was switched to Lyrica which once again was effective initially. Denies red flags including persistent fevers, chills, weight changes, neurogenic bowel or bladder symptoms, but affirms bowel incontinence. Pt will follow up with Dr. Alex Mancia next week for surgical consult. He has previously seen a neurologist for headaches and restless leg. He continues to see GI for fecal urgency/incontinence. He had a colonoscopy. He is under a lot of stress as he is self employed as a . AMB PAIN ASSESSMENT 2/16/2023   Location of Pain Back   Severity of Pain 4   Quality of Pain Sharp   Duration of Pain Persistent   Frequency of Pain Constant   Aggravating Factors Other (Comment)   Relieving Factors Other (Comment)   Result of Injury No     Onset: 2020    Investigations:  L MRI 11/2022: eccentric left L4-5 bulge  L MRI 5/2020: HNP L lateral recess L4-5  Last L XR 2020: L L5-S1 pseudoarthrosis     Treatments patient has tried:  Physical therapy:No  Chiropractor: Yes temporary benefit 4/2020  Doing HEP: Yes; stretches  Beneficial medications: Lyrica, Naproxen  Failed medications: Gabapentin  Spinal injections:Left L5 SNRB 7/2020 w/benefit. Left L4 and L5 SNRB 5/2022 w/benefit. left L4, L5 SNRB 12/2022 with no benefit. Spinal surgery- No.     PMHx of chronic R knee pain, hyperlipidemia, remote bleeding ulcer (high school), chronic diarrhea, RLS. Work status:  pt works as self employed , days and nights.    PHYSICAL EXAMINATION    /85 (Site: Right Upper Arm, Position: Sitting, Cuff Size: Medium Adult)   Pulse 99   Temp 97.5 °F (36.4 °C) (Temporal) Resp 18   Ht 5' 7\" (1.702 m)   Wt 230 lb (104.3 kg)   SpO2 96% Comment: RA  BMI 36.02 kg/m²     Intact supported toe rise, heel rise  Intact tandem gait  LE strength intact except minimal eversion  Back pain with left hip ROM  SLR negative                Written by Cindy Ross, as dictated by Kinjal Sierra MD.    This note was created using Dragon transcription software and may contain unintended errors.

## 2023-02-16 NOTE — PROGRESS NOTES
Cy Salvage presents today for   Chief Complaint   Patient presents with    Back Pain       Is someone accompanying this pt? no    Is the patient using any DME equipment during OV? no    Depression Screening:  No flowsheet data found. Learning Assessment:  No flowsheet data found. Abuse Screening:  No flowsheet data found. Fall Risk  No flowsheet data found. OPIOID RISK TOOL  No flowsheet data found. Coordination of Care:  1. Have you been to the ER, urgent care clinic since your last visit? no  Hospitalized since your last visit? no    2. Have you seen or consulted any other health care providers outside of the [address] [place] [address] since your last visit? no Include any pap smears or colon screening.  n0

## 2023-05-09 DIAGNOSIS — M51.26 OTHER INTERVERTEBRAL DISC DISPLACEMENT, LUMBAR REGION: ICD-10-CM

## 2023-05-09 DIAGNOSIS — M54.16 RADICULOPATHY, LUMBAR REGION: ICD-10-CM

## 2023-05-09 RX ORDER — NAPROXEN 500 MG/1
TABLET ORAL
Qty: 180 TABLET | Refills: 3 | Status: SHIPPED | OUTPATIENT
Start: 2023-05-09

## 2023-05-09 NOTE — TELEPHONE ENCOUNTER
Requested Prescriptions     Pending Prescriptions Disp Refills    naproxen (NAPROSYN) 500 MG tablet [Pharmacy Med Name: NAPROXEN TABS 500MG] 180 tablet 3     Sig: TAKE 1 TABLET TWICE A DAY AS NEEDED FOR PAIN      Patient was last seen on 2/16/23. Patient's choice of pharmacy [facility].     The refill request's last refill info   naproxen (NAPROSYN) 500 MG tablet 180 tablet 0 2/16/2023     Sig: TAKE 1 TABLET BY MOUTH  TWICE DAILY AS NEEDED FOR  PAIN    Sent to pharmacy as: Naproxen 500 MG Oral Tablet (NAPROSYN)    E-Prescribing Status: Receipt confirmed by pharmacy (2/16/2023  4:26 PM EST)

## (undated) DEVICE — (D)NDL SPNE 22GX15CM -- DISC BY MFR W/NO SUB

## (undated) DEVICE — SYR 10ML LUER LOK 1/5ML GRAD --

## (undated) DEVICE — MEDIA CONTRAST 10ML 200MG/ML 41%

## (undated) DEVICE — BANDAGE ADH W0.75XL3IN UNIV WVN FAB NAT GEN USE STRP N ADH

## (undated) DEVICE — TRAY MYEL SFTY +

## (undated) DEVICE — NDL SPNE MANAN 22GX6IN --

## (undated) DEVICE — AVANOS* SHORT BEVEL NEEDLE: Brand: AVANOS

## (undated) DEVICE — (D)BNDG ADHESIVE FABRIC 3/4X3 -- DISC BY MFR USE ITEM 357960